# Patient Record
Sex: FEMALE | Race: WHITE | Employment: FULL TIME | URBAN - METROPOLITAN AREA
[De-identification: names, ages, dates, MRNs, and addresses within clinical notes are randomized per-mention and may not be internally consistent; named-entity substitution may affect disease eponyms.]

---

## 2017-03-22 LAB
HPV COMMENT: NORMAL
HPV TYPE 16: NOT DETECTED
HPV TYPE 18: NOT DETECTED
HPVOH (OTHER TYPES): NOT DETECTED

## 2017-12-08 ENCOUNTER — OFFICE VISIT (OUTPATIENT)
Dept: FAMILY MEDICINE CLINIC | Age: 47
End: 2017-12-08

## 2017-12-08 VITALS
TEMPERATURE: 98.2 F | BODY MASS INDEX: 29.56 KG/M2 | HEIGHT: 65 IN | WEIGHT: 177.4 LBS | RESPIRATION RATE: 16 BRPM | DIASTOLIC BLOOD PRESSURE: 80 MMHG | SYSTOLIC BLOOD PRESSURE: 120 MMHG | HEART RATE: 99 BPM | OXYGEN SATURATION: 99 %

## 2017-12-08 DIAGNOSIS — I10 ESSENTIAL HYPERTENSION: ICD-10-CM

## 2017-12-08 DIAGNOSIS — J02.9 SORE THROAT: ICD-10-CM

## 2017-12-08 DIAGNOSIS — Z71.6 ENCOUNTER FOR SMOKING CESSATION COUNSELING: ICD-10-CM

## 2017-12-08 DIAGNOSIS — Z72.0 CURRENT TOBACCO USE: ICD-10-CM

## 2017-12-08 DIAGNOSIS — R06.2 WHEEZING: ICD-10-CM

## 2017-12-08 DIAGNOSIS — R05.9 COUGH: ICD-10-CM

## 2017-12-08 DIAGNOSIS — J40 BRONCHITIS: Primary | ICD-10-CM

## 2017-12-08 LAB — S PYO AG THROAT QL: NORMAL

## 2017-12-08 PROCEDURE — 87880 STREP A ASSAY W/OPTIC: CPT | Performed by: CLINICAL NURSE SPECIALIST

## 2017-12-08 PROCEDURE — 99214 OFFICE O/P EST MOD 30 MIN: CPT | Performed by: CLINICAL NURSE SPECIALIST

## 2017-12-08 RX ORDER — AMOXICILLIN AND CLAVULANATE POTASSIUM 500; 125 MG/1; MG/1
1 TABLET, FILM COATED ORAL 2 TIMES DAILY
Qty: 14 TABLET | Refills: 0 | Status: SHIPPED | OUTPATIENT
Start: 2017-12-08 | End: 2017-12-15

## 2017-12-08 RX ORDER — GUAIFENESIN AND CODEINE PHOSPHATE 100; 10 MG/5ML; MG/5ML
5 SOLUTION ORAL 2 TIMES DAILY PRN
Qty: 140 ML | Refills: 0 | Status: SHIPPED | OUTPATIENT
Start: 2017-12-08 | End: 2017-12-15

## 2017-12-08 RX ORDER — PREDNISONE 20 MG/1
20 TABLET ORAL 2 TIMES DAILY
Qty: 10 TABLET | Refills: 0 | Status: SHIPPED | OUTPATIENT
Start: 2017-12-08 | End: 2017-12-13

## 2017-12-08 RX ORDER — ALBUTEROL SULFATE 90 UG/1
2 AEROSOL, METERED RESPIRATORY (INHALATION) EVERY 6 HOURS PRN
Qty: 1 INHALER | Refills: 0 | Status: CANCELLED | OUTPATIENT
Start: 2017-12-08

## 2017-12-08 RX ORDER — VARENICLINE TARTRATE 25 MG
KIT ORAL
Qty: 1 EACH | Refills: 0 | Status: SHIPPED | OUTPATIENT
Start: 2017-12-08 | End: 2018-01-05 | Stop reason: SDUPTHER

## 2017-12-08 RX ORDER — VARENICLINE TARTRATE 1 MG/1
1 TABLET, FILM COATED ORAL 2 TIMES DAILY
Qty: 60 TABLET | Refills: 0 | Status: SHIPPED | OUTPATIENT
Start: 2017-12-08 | End: 2018-01-05 | Stop reason: SDUPTHER

## 2017-12-08 ASSESSMENT — ENCOUNTER SYMPTOMS
WHEEZING: 1
RHINORRHEA: 1
SINUS PRESSURE: 0
SHORTNESS OF BREATH: 1
NAUSEA: 0
CHEST TIGHTNESS: 1
SINUS PAIN: 0
ABDOMINAL PAIN: 0
SORE THROAT: 1
DIARRHEA: 0
COUGH: 1

## 2017-12-08 NOTE — PATIENT INSTRUCTIONS
Antibiotic as directed, twice a day for 7 days    Flonase 2 puffs to each nostril daily. Albuterol 2 puffs 4 times a day for 2-3 days then as needed. Steroids as directed, take with food. (twice a day with food)    Prescription cough medication twice a day as needed for cough, may cause drowsiness. Cepacol Lozenges as needed for sore throat. Tylenol and or Motrin as needed for pain and fever. Encourage rest and fluids. Chantix as directed, follow up in 6 weeks after starting medication    Patient Education        Bronchitis: Care Instructions  Your Care Instructions    Bronchitis is inflammation of the bronchial tubes, which carry air to the lungs. The tubes swell and produce mucus, or phlegm. The mucus and inflamed bronchial tubes make you cough. You may have trouble breathing. Most cases of bronchitis are caused by viruses like those that cause colds. Antibiotics usually do not help and they may be harmful. Bronchitis usually develops rapidly and lasts about 2 to 3 weeks in otherwise healthy people. Follow-up care is a key part of your treatment and safety. Be sure to make and go to all appointments, and call your doctor if you are having problems. It's also a good idea to know your test results and keep a list of the medicines you take. How can you care for yourself at home? · Take all medicines exactly as prescribed. Call your doctor if you think you are having a problem with your medicine. · Get some extra rest.  · Take an over-the-counter pain medicine, such as acetaminophen (Tylenol), ibuprofen (Advil, Motrin), or naproxen (Aleve) to reduce fever and relieve body aches. Read and follow all instructions on the label. · Do not take two or more pain medicines at the same time unless the doctor told you to. Many pain medicines have acetaminophen, which is Tylenol. Too much acetaminophen (Tylenol) can be harmful.   · Take an over-the-counter cough medicine that contains

## 2017-12-08 NOTE — PROGRESS NOTES
SUBJECTIVE:    Patient ID:  Jenise Tidwell is a 41 y.o. female      Patient is here for an acute visit for URI symptoms and for smoking cessation. She went to and urgent care late October and was diagnosed with bronchitis, treated with Levaquin, steroid dose pack, albuterol inhaler and tessalon. Symptoms resolved for about 2 weeks. She has been a smoker on and off since she was 17year old. Discussed risk, benefits and potential adverse affects of Chantix, patient agreeable to treatment. URI    This is a new problem. Episode onset: 3 weeks ago  The problem has been gradually worsening. There has been no fever. Associated symptoms include congestion (chest), coughing (non productive), a plugged ear sensation, rhinorrhea, a sore throat, vomiting (occ from cough) and wheezing. Pertinent negatives include no abdominal pain, chest pain, diarrhea, headaches, nausea, rash, sinus pain or sneezing. She has tried inhaler use, decongestant and NSAIDs (NyQuil, DayQuil, Mucinex D, Claritin D, Sudafed) for the symptoms. The treatment provided mild relief.      Past Medical History:   Diagnosis Date    Anxiety     Depression     GERD (gastroesophageal reflux disease)     Hyperlipidemia     Hypertension     Migraine     Nausea & vomiting     Obesity     S/P bariatric surgery      Past Surgical History:   Procedure Laterality Date    ACROMIOPLASTY      L Shoulder    CERVICAL FUSION      CERVIX SURGERY      Conization      SECTION      COLPOSCOPY      ENDOMETRIAL ABLATION      LAP BAND  2011    LIPOSUCTION       Family History   Problem Relation Age of Onset    Heart Disease Paternal Grandfather     High Blood Pressure Paternal Grandfather     Stroke Paternal Grandfather     Diabetes Paternal Grandfather     Arthritis Paternal Grandfather     Clotting Disorder Paternal Grandfather     Glaucoma Paternal Grandfather     Heart Disease Paternal Grandmother     High Blood and no frontal sinus tenderness. Mouth/Throat: Posterior oropharyngeal erythema present. No oropharyngeal exudate or posterior oropharyngeal edema. Eyes: Conjunctivae are normal. Pupils are equal, round, and reactive to light. Neck: Normal range of motion. Neck supple. No tracheal deviation present. Cardiovascular: Normal rate, regular rhythm and normal heart sounds. Pulmonary/Chest: Effort normal. No respiratory distress. She has wheezes. She has no rales. She exhibits no tenderness. Abdominal: Soft. Bowel sounds are normal.   Musculoskeletal: Normal range of motion. She exhibits no edema. Lymphadenopathy:     She has no cervical adenopathy. Neurological: She is alert and oriented to person, place, and time. Skin: Skin is warm and dry. No rash noted. Psychiatric: She has a normal mood and affect. Her behavior is normal.   Nursing note and vitals reviewed. /80   Pulse 99   Temp 98.2 °F (36.8 °C)   Resp 16   Ht 5' 5\" (1.651 m)   Wt 177 lb 6.4 oz (80.5 kg)   SpO2 99%   BMI 29.52 kg/m²    BP Readings from Last 3 Encounters:   12/08/17 120/80   09/09/16 118/70   06/09/16 140/84      Wt Readings from Last 3 Encounters:   12/08/17 177 lb 6.4 oz (80.5 kg)   09/09/16 185 lb 9.6 oz (84.2 kg)   07/07/16 184 lb (83.5 kg)       ASSESSMENT & PLAN:    1. Bronchitis  - predniSONE (DELTASONE) 20 MG tablet; Take 1 tablet by mouth 2 times daily for 5 days  Dispense: 10 tablet; Refill: 0  - amoxicillin-clavulanate (AUGMENTIN) 500-125 MG per tablet; Take 1 tablet by mouth 2 times daily for 7 days  Dispense: 14 tablet; Refill: 0  - guaiFENesin-codeine (TUSSI-ORGANIDIN NR) 100-10 MG/5ML syrup; Take 5 mLs by mouth 2 times daily as needed for Cough . Dispense: 140 mL; Refill: 0  - albuterol (PROVENTIL) (2.5 MG/3ML) 0.083% nebulizer solution; Take 3 mLs by nebulization every 6 hours as needed for Wheezing  Dispense: 120 each; Refill: 0  - Albuterol 2 puffs 4 times a day for 2-3 days then as needed.     - Steroids as directed, take with food. (twice a day with food)    2. Wheezing  - predniSONE (DELTASONE) 20 MG tablet; Take 1 tablet by mouth 2 times daily for 5 days  Dispense: 10 tablet; Refill: 0  - albuterol (PROVENTIL) (2.5 MG/3ML) 0.083% nebulizer solution; Take 3 mLs by nebulization every 6 hours as needed for Wheezing  Dispense: 120 each; Refill: 0    3. Cough  - guaiFENesin-codeine (TUSSI-ORGANIDIN NR) 100-10 MG/5ML syrup; Take 5 mLs by mouth 2 times daily as needed for Cough . Dispense: 140 mL; Refill: 0  - Prescription cough medication twice a day as needed for cough, may cause drowsiness. 4. Sore throat  - POCT rapid strep A (negative)  - Cepacol Lozenges as needed for sore throat. - Tylenol and or Motrin as needed for pain and fever. 5. Essential hypertension  - Stable, continue to monitor    6. Current tobacco use  - Encourage tobacco cesstion    7. Encounter for smoking cessation counseling  - Discussed risk, benefits and potential adverse affects of Chantix, patient agreeable to treatment  - varenicline (CHANTIX CONTINUING MONTH PAK) 1 MG tablet; Take 1 tablet by mouth 2 times daily  Dispense: 60 tablet; Refill: 0  - varenicline (CHANTIX STARTING MONTH PAK) 0.5 MG X 11 & 1 MG X 42 tablet; Take by mouth. Dispense: 1 each; Refill: 0      Continue current treatment plan. Return in about 2 weeks (around 12/22/2017), or if symptoms worsen or fail to improve, for bronchitis, wheezing, cough, sore throat, HTN, tobacco use. Call office if experience side effects from medications.

## 2017-12-09 RX ORDER — ALBUTEROL SULFATE 2.5 MG/3ML
2.5 SOLUTION RESPIRATORY (INHALATION) EVERY 6 HOURS PRN
Qty: 120 EACH | Refills: 0 | Status: SHIPPED | OUTPATIENT
Start: 2017-12-09 | End: 2018-01-05 | Stop reason: SDUPTHER

## 2017-12-09 ASSESSMENT — ENCOUNTER SYMPTOMS: VOMITING: 1

## 2017-12-12 ENCOUNTER — TELEPHONE (OUTPATIENT)
Dept: FAMILY MEDICINE CLINIC | Age: 47
End: 2017-12-12

## 2017-12-20 ENCOUNTER — TELEPHONE (OUTPATIENT)
Dept: FAMILY MEDICINE CLINIC | Age: 47
End: 2017-12-20

## 2018-01-05 ENCOUNTER — OFFICE VISIT (OUTPATIENT)
Dept: FAMILY MEDICINE CLINIC | Age: 48
End: 2018-01-05

## 2018-01-05 VITALS
OXYGEN SATURATION: 98 % | HEART RATE: 107 BPM | SYSTOLIC BLOOD PRESSURE: 120 MMHG | RESPIRATION RATE: 16 BRPM | DIASTOLIC BLOOD PRESSURE: 80 MMHG | BODY MASS INDEX: 30.32 KG/M2 | TEMPERATURE: 98.2 F | WEIGHT: 182 LBS | HEIGHT: 65 IN

## 2018-01-05 DIAGNOSIS — J30.89 ACUTE NON-SEASONAL ALLERGIC RHINITIS, UNSPECIFIED TRIGGER: ICD-10-CM

## 2018-01-05 DIAGNOSIS — R05.9 COUGH: ICD-10-CM

## 2018-01-05 DIAGNOSIS — R06.2 WHEEZING: ICD-10-CM

## 2018-01-05 DIAGNOSIS — Z71.6 ENCOUNTER FOR SMOKING CESSATION COUNSELING: ICD-10-CM

## 2018-01-05 DIAGNOSIS — J45.40 MODERATE PERSISTENT ASTHMA WITHOUT COMPLICATION: Primary | ICD-10-CM

## 2018-01-05 PROCEDURE — 99214 OFFICE O/P EST MOD 30 MIN: CPT | Performed by: CLINICAL NURSE SPECIALIST

## 2018-01-05 RX ORDER — VARENICLINE TARTRATE 1 MG/1
1 TABLET, FILM COATED ORAL 2 TIMES DAILY
Qty: 60 TABLET | Refills: 0 | Status: SHIPPED | OUTPATIENT
Start: 2018-01-05 | End: 2019-06-27

## 2018-01-05 RX ORDER — LORATADINE 10 MG/1
10 TABLET ORAL DAILY
Qty: 30 TABLET | Refills: 0 | Status: SHIPPED | OUTPATIENT
Start: 2018-01-05 | End: 2019-06-27

## 2018-01-05 RX ORDER — FLUTICASONE PROPIONATE 110 UG/1
2 AEROSOL, METERED RESPIRATORY (INHALATION) 2 TIMES DAILY
Qty: 1 INHALER | Refills: 0 | Status: SHIPPED | OUTPATIENT
Start: 2018-01-05 | End: 2019-06-27

## 2018-01-05 RX ORDER — ALBUTEROL SULFATE 90 UG/1
2 AEROSOL, METERED RESPIRATORY (INHALATION) EVERY 6 HOURS PRN
Qty: 1 INHALER | Refills: 3 | Status: SHIPPED | OUTPATIENT
Start: 2018-01-05 | End: 2019-06-27

## 2018-01-05 ASSESSMENT — ENCOUNTER SYMPTOMS
SHORTNESS OF BREATH: 1
SORE THROAT: 0
ABDOMINAL PAIN: 0
CHEST TIGHTNESS: 1
DIARRHEA: 0
VOMITING: 0
COUGH: 1
HOARSE VOICE: 1
NAUSEA: 0
HEARTBURN: 0
SPUTUM PRODUCTION: 0
DIFFICULTY BREATHING: 1
HEMOPTYSIS: 0

## 2018-01-05 NOTE — PROGRESS NOTES
present. Cardiovascular: Normal rate, regular rhythm and normal heart sounds. Pulmonary/Chest: Effort normal. No respiratory distress. She has wheezes. She has no rales. She exhibits no tenderness. Abdominal: Soft. Bowel sounds are normal. She exhibits no distension and no mass. There is no tenderness. There is no rebound and no guarding. Musculoskeletal: Normal range of motion. She exhibits no edema. Neurological: She is alert and oriented to person, place, and time. No cranial nerve deficit. Skin: Skin is warm and dry. No rash noted. Psychiatric: She has a normal mood and affect. Her behavior is normal.   Nursing note and vitals reviewed. /80   Pulse 107   Temp 98.2 °F (36.8 °C)   Resp 16   Ht 5' 5\" (1.651 m)   Wt 182 lb (82.6 kg)   LMP 08/01/2017   SpO2 98%   BMI 30.29 kg/m²    BP Readings from Last 3 Encounters:   01/05/18 120/80   12/08/17 120/80   09/09/16 118/70      Wt Readings from Last 3 Encounters:   01/05/18 182 lb (82.6 kg)   12/08/17 177 lb 6.4 oz (80.5 kg)   09/09/16 185 lb 9.6 oz (84.2 kg)       ASSESSMENT & PLAN:    1. Moderate persistent asthma without complication  - asthma versus COPD  - Consider PFT's if no improvement with Flovent  - albuterol sulfate HFA (PROAIR HFA) 108 (90 Base) MCG/ACT inhaler; Inhale 2 puffs into the lungs every 6 hours as needed for Wheezing  Dispense: 1 Inhaler; Refill: 3  - fluticasone (FLOVENT HFA) 110 MCG/ACT inhaler; Inhale 2 puffs into the lungs 2 times daily  Dispense: 1 Inhaler; Refill: 0 (rinse mouth out after use)  - Discussed the difference between maintenance and rescue inhalers  - Trial of Flovent 1 puff twice a day, can go up to 2 puffs twice a day    2. Wheezing  - albuterol sulfate HFA (PROAIR HFA) 108 (90 Base) MCG/ACT inhaler; Inhale 2 puffs into the lungs every 6 hours as needed for Wheezing  Dispense: 1 Inhaler; Refill: 3  - fluticasone (FLOVENT HFA) 110 MCG/ACT inhaler;  Inhale 2 puffs into the lungs 2 times daily Dispense: 1 Inhaler; Refill: 0 (rinse mouth out after use)    3. Cough  - XR chest standard (2 VW)    4. Encounter for smoking cessation counseling  - varenicline (CHANTIX CONTINUING MONTH RADHA) 1 MG tablet; Take 1 tablet by mouth 2 times daily  Dispense: 60 tablet; Refill: 0  - Encourage smoking cessation    5. Acute non-seasonal allergic rhinitis, unspecified trigger  - loratadine (CLARITIN) 10 MG tablet; Take 1 tablet by mouth daily  Dispense: 30 tablet; Refill: 0    6. Essential hypertension  - Stable on current regimen    Continue current treatment plan. Return in about 4 weeks (around 2/2/2018), or if symptoms worsen or fail to improve, for asthma, wheezing, cough, smoking cesstion, AR, HTN. Call office if experience side effects from medications.

## 2018-01-05 NOTE — PATIENT INSTRUCTIONS
Now\" link. Current as of: May 12, 2017  Content Version: 11.5  © 2978-1280 [employer]. Care instructions adapted under license by [facility] ([facility]). If you have questions about a medical condition or this instruction, always ask your healthcare professional. [address] any warranty or liability for your use of this information. Patient Education        Learning About COPD, Asthma, and Air Pollution  How does air pollution affect COPD and asthma? When you have COPD or asthma, air pollution may make your symptoms worse. If it does, it means that air pollution is a trigger for you. It is important to know what your triggers are and how to deal with them. If air pollution is a trigger for you, you need to learn about air quality and pay attention to weather forecasts that include how bad the air is expected to be. How can you manage a flare-up caused by air pollution? · Do not panic. Quick treatment at home may help you prevent serious breathing problems. · Take your medicines exactly as your doctor tells you. ¨ Use your quick-relief inhaler as directed by your doctor. If your symptoms do not get better after you use your medicine, have someone take you to the emergency room. Call an ambulance if necessary. ¨ With inhaled medicines, a spacer or a nebulizer may help you get more medicine to your lungs. Ask your doctor or pharmacist how to use them properly. Practice using the spacer in front of a mirror before you have a flare-up. This may help you get the medicine into your lungs quickly. ¨ If your doctor has given you steroid pills, take them as directed. ¨ Talk to your doctor if you have any problems with your medicine. What can you do to prevent flare-ups? · Try not to be outside when air pollution levels are high. Stay at home with your windows closed. · Do not smoke.  This is the most important step you can take to prevent more damage to your lungs and prevent working. · Do not smoke or allow others to smoke around you. Avoid smoky places. Smoking makes asthma worse. If you need help quitting, talk to your doctor about stop-smoking programs and medicines. These can increase your chances of quitting for good. · Learn what triggers an asthma attack for you, and avoid the triggers when you can. Common triggers include colds, smoke, air pollution, dust, pollen, mold, pets, cockroaches, stress, and cold air. · Avoid colds and the flu. Get a pneumococcal vaccine shot. If you have had one before, ask your doctor whether you need a second dose. Get a flu vaccine every fall. If you must be around people with colds or the flu, wash your hands often. When should you call for help? Call 911 anytime you think you may need emergency care. For example, call if:  ? · You have severe trouble breathing. ?Call your doctor now or seek immediate medical care if:  ? · Your symptoms do not get better after you have followed your asthma action plan. ? · You cough up yellow, dark brown, or bloody mucus (sputum). ? Watch closely for changes in your health, and be sure to contact your doctor if:  ? · Your coughing and wheezing get worse. ? · You need to use quick-relief medicine on more than 2 days a week (unless it is just for exercise). ? · You need help figuring out what is triggering your asthma attacks. Where can you learn more? Go to https://[email or web address] org and sign in to your Open[employer] account. Enter P597 in the [facility] box to learn more about \"Asthma in Adults: Care Instructions. \"     If you do not have an account, please click on the \"Sign Up Now\" link. Current as of: May 12, 2017  Content Version: 11.5  © 6931-1856 [employer], [employer]. Care instructions adapted under license by [facility] ([facility]).  If you have questions about a medical condition or this instruction, always ask your healthcare professional. [address] any warranty or liability for your use of this information.

## 2018-01-14 ENCOUNTER — TELEPHONE (OUTPATIENT)
Dept: FAMILY MEDICINE CLINIC | Age: 48
End: 2018-01-14

## 2018-01-14 ASSESSMENT — ENCOUNTER SYMPTOMS
SINUS PRESSURE: 0
RHINORRHEA: 1
TROUBLE SWALLOWING: 0
WHEEZING: 1

## 2019-06-27 ENCOUNTER — HOSPITAL ENCOUNTER (EMERGENCY)
Age: 49
Discharge: HOME | End: 2019-06-28
Attending: INTERNAL MEDICINE | Admitting: INTERNAL MEDICINE
Payer: COMMERCIAL

## 2019-06-27 DIAGNOSIS — M79.605 LEFT LEG PAIN: ICD-10-CM

## 2019-06-27 PROCEDURE — 99284 EMERGENCY DEPT VISIT MOD MDM: CPT

## 2019-06-27 ASSESSMENT — PAIN SCALES - GENERAL: PAINLEVEL_OUTOF10: 8

## 2019-06-27 NOTE — LETTER
[facility]  [address] [facility], [address]             June 28, 2019    Patient: Danilo Carrasquillo   YOB: 1970   Date of Visit: 6/27/2019       To Whom It May Concern:    Erica Brooks was seen and treated in our emergency department on 6/27/2019. She may return to work on Monday 7/1/2019.       Sincerely,         [facility]

## 2019-06-28 ENCOUNTER — APPOINTMENT (OUTPATIENT)
Dept: GENERAL RADIOLOGY | Age: 49
End: 2019-06-28
Payer: COMMERCIAL

## 2019-06-28 VITALS
HEART RATE: 82 BPM | TEMPERATURE: 98.1 F | SYSTOLIC BLOOD PRESSURE: 135 MMHG | OXYGEN SATURATION: 96 % | DIASTOLIC BLOOD PRESSURE: 76 MMHG | RESPIRATION RATE: 16 BRPM

## 2019-06-28 PROCEDURE — 6370000000 HC RX 637 (ALT 250 FOR IP): Performed by: PHYSICIAN ASSISTANT

## 2019-06-28 PROCEDURE — 73502 X-RAY EXAM HIP UNI 2-3 VIEWS: CPT

## 2019-06-28 RX ORDER — PREDNISONE 10 MG/1
60 TABLET ORAL DAILY
Qty: 30 TABLET | Refills: 0 | Status: SHIPPED | OUTPATIENT
Start: 2019-06-28 | End: 2019-07-03

## 2019-06-28 RX ORDER — HYDROCODONE BITARTRATE AND ACETAMINOPHEN 5; 325 MG/1; MG/1
2 TABLET ORAL ONCE
Status: COMPLETED | OUTPATIENT
Start: 2019-06-28 | End: 2019-06-28

## 2019-06-28 RX ORDER — PREDNISONE 20 MG/1
60 TABLET ORAL ONCE
Status: COMPLETED | OUTPATIENT
Start: 2019-06-28 | End: 2019-06-28

## 2019-06-28 RX ADMIN — HYDROCODONE BITARTRATE AND ACETAMINOPHEN 2 TABLET: 5; 325 TABLET ORAL at 00:53

## 2019-06-28 RX ADMIN — PREDNISONE 60 MG: 20 TABLET ORAL at 00:53

## 2019-06-28 ASSESSMENT — PAIN SCALES - GENERAL
PAINLEVEL_OUTOF10: 8
PAINLEVEL_OUTOF10: 10

## 2019-06-28 ASSESSMENT — PAIN DESCRIPTION - PAIN TYPE: TYPE: ACUTE PAIN

## 2019-06-28 ASSESSMENT — PAIN DESCRIPTION - ORIENTATION: ORIENTATION: LEFT

## 2019-06-28 ASSESSMENT — PAIN DESCRIPTION - LOCATION: LOCATION: LEG

## 2019-06-28 ASSESSMENT — ENCOUNTER SYMPTOMS
BACK PAIN: 0
VOMITING: 0
NAUSEA: 0

## 2019-06-28 ASSESSMENT — PAIN DESCRIPTION - PROGRESSION: CLINICAL_PROGRESSION: GRADUALLY IMPROVING

## 2019-06-28 NOTE — ED PROVIDER NOTES
[facility]        Pt Name: Lidya Farr  MRN: [id number]  [place] 1970  Date of evaluation: 6/27/2019  Provider: Ara Naranjo PA-C  PCP: Vijaya Loredo MD    This patient was not seen and evaluated by the attending physician but were available for consultation as needed      [facility]       Chief Complaint   Patient presents with    Leg Pain     lots of travel with work and c/o pain in left hip and down leg. feels tight. HISTORY OF PRESENT ILLNESS   (Location/Symptom, Timing/Onset, Context/Setting, Quality, Duration, Modifying Factors, Severity)  Note limiting factors. Lidya Farr is a 54 y.o. female who presents to the emergency department today for evaluation for left leg pain. The patient states that she has had constant pain to her posterior left thigh, and she states that this started this afternoon. The patient states that her pain is only to the posterior aspect of her thigh, she denies any back pain. She denies any fall or injury. She states that her pain is rated as a 8/10, she has tried to take ibuprofen as well as muscle relaxers at home without any improvement. The patient states that she has been able to ambulate but she has been experiencing pain. The patient states that she has had some travels to [facility] recently for work. She denies any estrogen therapies. She denies any chest pain or shortness of breath. No history of DVT or PE. She denies any pain to the calf or swelling to the calf. She is very specific that her pain is to her posterior thigh on the left. Patient denies any fever or chills. No numbness, tingling or weakness. No bowel or bladder incontinence or retention. No saddle anesthesia. no nausea or vomiting. No other complaints. Nursing Notes were all reviewed and agreed with or any disagreements were addressed  in the HPI.     REVIEW OF SYSTEMS    (2-9 systems for level 4, 10 or more for level 5)     Review of Systems   Constitutional: Negative for activity change, appetite change and fever. Gastrointestinal: Negative for nausea and vomiting. Musculoskeletal: Positive for myalgias. Negative for arthralgias and back pain. Skin: Negative for wound. Neurological: Negative for weakness and numbness. Positives and Pertinent negatives as per HPI. Except as noted abovein the ROS, all other systems were reviewed and negative. PAST MEDICAL HISTORY     Past Medical History:   Diagnosis Date    Anxiety     Depression     GERD (gastroesophageal reflux disease)     Hyperlipidemia     Hypertension     Migraine     Nausea & vomiting     Obesity     S/P bariatric surgery          SURGICAL HISTORY     Past Surgical History:   Procedure Laterality Date    ACROMIOPLASTY      L Shoulder    CERVICAL FUSION      CERVIX SURGERY      Conization      SECTION      COLPOSCOPY      ENDOMETRIAL ABLATION      LAP BAND      LIPOSUCTION           CURRENTMEDICATIONS       Discharge Medication List as of 2019  1:46 AM      CONTINUE these medications which have NOT CHANGED    Details   lamoTRIgine (LAMICTAL) 200 MG tablet Take 200 mg by mouth daily               ALLERGIES     Ace inhibitors;  Percocet [oxycodone-acetaminophen]; Sulfa antibiotics; and Codeine    FAMILYHISTORY       Family History   Problem Relation Age of Onset    Heart Disease Paternal Grandfather     High Blood Pressure Paternal Grandfather     Stroke Paternal Grandfather     Diabetes Paternal Grandfather     Arthritis Paternal Grandfather     Clotting Disorder Paternal Grandfather     Glaucoma Paternal Grandfather     Heart Disease Paternal Grandmother     High Blood Pressure Paternal Grandmother     Stroke Paternal Grandmother     Diabetes Paternal Grandmother     Arthritis Paternal Grandmother     Clotting Disorder Paternal Grandmother     Glaucoma Paternal Grandmother           SOCIAL HISTORY       Social History     Socioeconomic History    Marital status:      Spouse name: None    Number of children: None    Years of education: None    Highest education level: None   Occupational History    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[employer] [address] resource strain: None    Food insecurity:     Worry: None     Inability: None    Transportation needs:     Medical: None     Non-medical: None   Tobacco Use    Smoking status: Former Smoker     Packs/day: 1.00     Years: 20.00     Pack years: 20.00     Types: Cigarettes     Last attempt to quit: 2013     Years since quittin.5    Smokeless tobacco: Never Used   Substance and Sexual Activity    Alcohol use: Yes     Comment: RARELY    Drug use: No    Sexual activity: Yes     Partners: Male   Lifestyle    Physical activity:     Days per week: None     Minutes per session: None    Stress: None   Relationships    Social connections:     Talks on phone: None     Gets together: None     Attends [employer]ism service: None     Active member of club or organization: None     Attends meetings of clubs or organizations: None     Relationship status: None    Intimate partner violence:     Fear of current or ex partner: None     Emotionally abused: None     Physically abused: None     Forced sexual activity: None   Other Topics Concern    None   Social History Narrative    None       SCREENINGS             PHYSICAL EXAM    (up to 7 for level 4, 8 or more for level 5)     ED Triage Vitals [19 2236]   BP Temp Temp Source Pulse Resp SpO2 Height Weight   (!) 148/83 98.1 °F (36.7 °C) Infrared 108 16 97 % -- --       Physical Exam   Constitutional: She is oriented to person, place, and time. She appears well-developed and well-nourished. HENT:   Head: Normocephalic and atraumatic.    Right Ear: External ear normal.   Left Ear: External ear normal.   Nose: Nose normal.   Eyes: Right eye exhibits no discharge. Left eye exhibits no discharge. Neck: Normal range of motion. Neck supple. No tracheal deviation present. Cardiovascular: Intact distal pulses. Pulmonary/Chest: Effort normal. No respiratory distress. Musculoskeletal: Normal range of motion. There is tenderness to palpation to the left mid posterior thigh. There is no overlying erythema, edema, ecchymosis or warmth. There is no tenderness over the back, hip or knee. Dorsalis pedis and posterior tibialis pulses are 2+ bilaterally. Normal sensation light touch. Negative Homans sign bilaterally. No tenderness over the calf. Motor strength is 5/5 throughout. Patellar reflexes are 2+ bilaterally   Neurological: She is alert and oriented to person, place, and time. Skin: Skin is warm and dry. She is not diaphoretic. Psychiatric: She has a normal mood and affect. Her behavior is normal.   Nursing note and vitals reviewed. DIAGNOSTIC RESULTS   LABS:    Labs Reviewed - No data to display    All other labs were within normal range or not returned as of this dictation. EKG: All EKG's are interpreted by the Emergency Department Physician in the absence of a cardiologist.  Please see their note for interpretation of EKG. RADIOLOGY:   Non-plain film images such as CT, Ultrasound and MRI are read by the radiologist. Plain radiographic images are visualized andpreliminarily interpreted by the  ED Provider with the below findings:        Interpretation perthe Radiologist below, if available at the time of this note:    XR HIP LEFT (2-3 VIEWS)   Final Result   No acute fracture or malalignment. VL Extremity Venous Left    (Results Pending)     Xr Hip Left (2-3 Views)    Result Date: 6/28/2019  EXAMINATION: TWO XRAY VIEWS OF THE LEFT HIP 6/28/2019 12:53 am COMPARISON: None.  HISTORY: ORDERING SYSTEM PROVIDED HISTORY: Injury TECHNOLOGIST PROVIDED HISTORY: Reason for exam:->Injury Ordering Physician Provided Reason for Exam: L/hip pain Acuity: Acute Type of Exam: Initial FINDINGS: Hips are intact and in anatomic alignment. Bony pelvis maintained. Unremarkable soft tissues. No acute fracture or malalignment. PROCEDURES   Unless otherwise noted below, none     Procedures    CRITICAL CARE TIME   N/A    CONSULTS:  None      EMERGENCY DEPARTMENT COURSE and DIFFERENTIAL DIAGNOSIS/MDM:   Vitals:    Vitals:    06/27/19 2236 06/28/19 0145   BP: (!) 148/83 135/76   Pulse: 108 82   Resp: 16 16   Temp: 98.1 °F (36.7 °C)    TempSrc: Infrared    SpO2: 97% 96%       Patient was given thefollowing medications:  Medications   HYDROcodone-acetaminophen (NORCO) 5-325 MG per tablet 2 tablet (2 tablets Oral Given 6/28/19 0053)   predniSONE (DELTASONE) tablet 60 mg (60 mg Oral Given 6/28/19 0053)       The patient presents to the emergency department today for evaluation for left leg pain. The patient states that she has had constant pain to her posterior left thigh, and she states that this started this afternoon. The patient states that her pain is only to the posterior aspect of her thigh, she denies any back pain. She denies any fall or injury. She states that her pain is rated as a 8/10, she has tried to take ibuprofen as well as muscle relaxers at home without any improvement. The patient states that she has been able to ambulate but she has been experiencing pain. The patient states that she has had some travels to [facility] recently for work. She denies any estrogen therapies. She denies any chest pain or shortness of breath. No history of DVT or PE. She denies any pain to the calf or swelling to the calf. She is very specific that her pain is to her posterior thigh on the left. Patient denies any fever or chills. No nausea or vomiting. No other complaints.     On physical exam she does have tenderness to the posterior aspect of her left thigh, there is no other bony tenderness she is neurologically neurovascularly intact. X-ray is unremarkable. Patient was given Norco as well as prednisone in the ED. Upon reevaluation she is overall feeling better. Unfortunately ultrasound is unavailable at this time, I did offer the patient lab work to rule out a DVT at this time, and start empirically on possible anticoagulants, although my suspicion is low for a DVT she does have recent travel. Patient declines this, she states that she would just get the ultrasound done tomorrow. Patient will be given a prescription for prednisone as I believe that she could have some portion of sciatica causing her symptoms. She is to otherwise follow-up with her primary care physician in 2 to 3 days for reevaluation. She is to return to the ED for new or worsening symptoms. Patient voiced understanding is agreeable to plan. Stable for discharge. My suspicions at this time for occult fracture, dislocation, subluxation, arterial occlusion, cellulitis, abscess, necrotizing fasciitis, cauda equina syndrome or other emergent etiology. FINAL IMPRESSION      1.  Left leg pain          DISPOSITION/PLAN   DISPOSITION Decision To Discharge 06/28/2019 01:35:42 AM      PATIENT REFERREDTO:  Paz Lyons MD  [address], [address] [facility]. [address]  [phone]    Schedule an appointment as soon as possible for a visit in 2 days      [facility] Emergency Department  [address] [place] [address]  [phone]    As needed, If symptoms worsen      DISCHARGE MEDICATIONS:  Discharge Medication List as of 6/28/2019  1:46 AM      START taking these medications    Details   predniSONE (DELTASONE) 10 MG tablet Take 6 tablets by mouth daily for 5 doses, Disp-30 tablet, R-0Print             DISCONTINUED MEDICATIONS:  Discharge Medication List as of 6/28/2019  1:46 AM      STOP taking these medications       albuterol sulfate HFA (PROAIR HFA) 108 (90 Base) MCG/ACT inhaler Comments:   Reason for Stopping:         varenicline (CHANTIX CONTINUING MONTH RADHA) 1 MG tablet Comments:   Reason for Stopping:         fluticasone (FLOVENT HFA) 110 MCG/ACT inhaler Comments:   Reason for Stopping:         loratadine (CLARITIN) 10 MG tablet Comments:   Reason for Stopping:         topiramate (TOPAMAX) 50 MG tablet Comments:   Reason for Stopping:         methylPREDNISolone (MEDROL DOSEPACK) 4 MG tablet Comments:   Reason for Stopping:         ergocalciferol (DRISDOL) 64980 UNITS capsule Comments:   Reason for Stopping:         meloxicam (MOBIC) 7.5 MG tablet Comments:   Reason for Stopping:         VASCEPA 1 G CAPS capsule Comments:   Reason for Stopping:         ARIPiprazole (ABILIFY) 5 MG tablet Comments:   Reason for Stopping:                      (Please note that portions ofthis note were completed with a voice recognition program.  Efforts were made to edit the dictations but occasionally words are mis-transcribed.)    Zara Forbes PA-C (electronically signed)            Zara Forbes PA-C  06/28/19 1929

## 2019-06-28 NOTE — ED NOTES
Pt c/o left buttocks pain radiating down left back of leg like sciatica pain. Pt denies any hx of sciatica. Denies any known injury. Respirations even and unlabored. Meds given per orders. Pt to xray.      Paris Valentine, DELIO  06/28/19 2118

## 2019-08-27 LAB
CANDIDA SPECIES, DNA PROBE: NORMAL
GARDNERELLA VAGINALIS, DNA PROBE: NORMAL
TRICHOMONAS VAGINALIS DNA: NORMAL

## 2020-07-13 ENCOUNTER — HOSPITAL ENCOUNTER (EMERGENCY)
Age: 50
Discharge: HOME | End: 2020-07-13
Attending: EMERGENCY MEDICINE
Payer: COMMERCIAL

## 2020-07-13 ENCOUNTER — APPOINTMENT (OUTPATIENT)
Dept: GENERAL RADIOLOGY | Age: 50
End: 2020-07-13
Payer: COMMERCIAL

## 2020-07-13 ENCOUNTER — APPOINTMENT (OUTPATIENT)
Dept: CT IMAGING | Age: 50
End: 2020-07-13
Payer: COMMERCIAL

## 2020-07-13 VITALS
OXYGEN SATURATION: 96 % | HEIGHT: 65 IN | SYSTOLIC BLOOD PRESSURE: 103 MMHG | HEART RATE: 47 BPM | TEMPERATURE: 97.6 F | RESPIRATION RATE: 10 BRPM | DIASTOLIC BLOOD PRESSURE: 58 MMHG | BODY MASS INDEX: 30.16 KG/M2 | WEIGHT: 181 LBS

## 2020-07-13 LAB
A/G RATIO: 1.4 (ref 1.1–2.2)
ALBUMIN SERPL-MCNC: 4 G/DL (ref 3.4–5)
ALP BLD-CCNC: 75 U/L (ref 40–129)
ALT SERPL-CCNC: 13 U/L (ref 10–40)
ANION GAP SERPL CALCULATED.3IONS-SCNC: 13 MMOL/L (ref 3–16)
AST SERPL-CCNC: 13 U/L (ref 15–37)
BASOPHILS ABSOLUTE: 0 K/UL (ref 0–0.2)
BASOPHILS RELATIVE PERCENT: 0.5 %
BILIRUB SERPL-MCNC: 0.4 MG/DL (ref 0–1)
BUN BLDV-MCNC: 19 MG/DL (ref 7–20)
CALCIUM SERPL-MCNC: 9.1 MG/DL (ref 8.3–10.6)
CHLORIDE BLD-SCNC: 100 MMOL/L (ref 99–110)
CO2: 24 MMOL/L (ref 21–32)
CREAT SERPL-MCNC: 0.8 MG/DL (ref 0.6–1.1)
EOSINOPHILS ABSOLUTE: 0.2 K/UL (ref 0–0.6)
EOSINOPHILS RELATIVE PERCENT: 2 %
GFR AFRICAN AMERICAN: >60
GFR NON-AFRICAN AMERICAN: >60
GLOBULIN: 2.9 G/DL
GLUCOSE BLD-MCNC: 109 MG/DL (ref 70–99)
HCG QUALITATIVE: NEGATIVE
HCT VFR BLD CALC: 42.1 % (ref 36–48)
HEMOGLOBIN: 13.8 G/DL (ref 12–16)
LYMPHOCYTES ABSOLUTE: 2.8 K/UL (ref 1–5.1)
LYMPHOCYTES RELATIVE PERCENT: 26.5 %
MCH RBC QN AUTO: 28.6 PG (ref 26–34)
MCHC RBC AUTO-ENTMCNC: 32.9 G/DL (ref 31–36)
MCV RBC AUTO: 87 FL (ref 80–100)
MONOCYTES ABSOLUTE: 0.7 K/UL (ref 0–1.3)
MONOCYTES RELATIVE PERCENT: 6.4 %
NEUTROPHILS ABSOLUTE: 6.7 K/UL (ref 1.7–7.7)
NEUTROPHILS RELATIVE PERCENT: 64.6 %
PDW BLD-RTO: 14.6 % (ref 12.4–15.4)
PLATELET # BLD: 311 K/UL (ref 135–450)
PMV BLD AUTO: 7.3 FL (ref 5–10.5)
POTASSIUM SERPL-SCNC: 4.1 MMOL/L (ref 3.5–5.1)
PRO-BNP: 49 PG/ML (ref 0–124)
RBC # BLD: 4.84 M/UL (ref 4–5.2)
SODIUM BLD-SCNC: 137 MMOL/L (ref 136–145)
TOTAL PROTEIN: 6.9 G/DL (ref 6.4–8.2)
TROPONIN: <0.01 NG/ML
TROPONIN: <0.01 NG/ML
WBC # BLD: 10.4 K/UL (ref 4–11)

## 2020-07-13 PROCEDURE — 71260 CT THORAX DX C+: CPT

## 2020-07-13 PROCEDURE — 84703 CHORIONIC GONADOTROPIN ASSAY: CPT

## 2020-07-13 PROCEDURE — 93005 ELECTROCARDIOGRAM TRACING: CPT | Performed by: EMERGENCY MEDICINE

## 2020-07-13 PROCEDURE — 84484 ASSAY OF TROPONIN QUANT: CPT

## 2020-07-13 PROCEDURE — 71045 X-RAY EXAM CHEST 1 VIEW: CPT

## 2020-07-13 PROCEDURE — 6360000002 HC RX W HCPCS: Performed by: NURSE PRACTITIONER

## 2020-07-13 PROCEDURE — 36415 COLL VENOUS BLD VENIPUNCTURE: CPT

## 2020-07-13 PROCEDURE — 83880 ASSAY OF NATRIURETIC PEPTIDE: CPT

## 2020-07-13 PROCEDURE — 80053 COMPREHEN METABOLIC PANEL: CPT

## 2020-07-13 PROCEDURE — 96374 THER/PROPH/DIAG INJ IV PUSH: CPT

## 2020-07-13 PROCEDURE — 96375 TX/PRO/DX INJ NEW DRUG ADDON: CPT

## 2020-07-13 PROCEDURE — 93005 ELECTROCARDIOGRAM TRACING: CPT | Performed by: NURSE PRACTITIONER

## 2020-07-13 PROCEDURE — 85025 COMPLETE CBC W/AUTO DIFF WBC: CPT

## 2020-07-13 PROCEDURE — 6360000002 HC RX W HCPCS: Performed by: EMERGENCY MEDICINE

## 2020-07-13 PROCEDURE — 99285 EMERGENCY DEPT VISIT HI MDM: CPT

## 2020-07-13 PROCEDURE — 6360000004 HC RX CONTRAST MEDICATION: Performed by: NURSE PRACTITIONER

## 2020-07-13 PROCEDURE — 6370000000 HC RX 637 (ALT 250 FOR IP): Performed by: NURSE PRACTITIONER

## 2020-07-13 RX ORDER — POTASSIUM CHLORIDE 750 MG/1
40 TABLET, FILM COATED, EXTENDED RELEASE ORAL ONCE
Status: DISCONTINUED | OUTPATIENT
Start: 2020-07-13 | End: 2020-07-13

## 2020-07-13 RX ORDER — MORPHINE SULFATE 4 MG/ML
4 INJECTION, SOLUTION INTRAMUSCULAR; INTRAVENOUS ONCE
Status: COMPLETED | OUTPATIENT
Start: 2020-07-13 | End: 2020-07-13

## 2020-07-13 RX ORDER — ALBUTEROL SULFATE 2.5 MG/3ML
2.5 SOLUTION RESPIRATORY (INHALATION) EVERY 4 HOURS PRN
COMMUNITY
Start: 2020-01-04

## 2020-07-13 RX ORDER — ASPIRIN 81 MG/1
324 TABLET, CHEWABLE ORAL ONCE
Status: COMPLETED | OUTPATIENT
Start: 2020-07-13 | End: 2020-07-13

## 2020-07-13 RX ORDER — MONTELUKAST SODIUM 10 MG/1
10 TABLET ORAL NIGHTLY
COMMUNITY

## 2020-07-13 RX ORDER — SUCRALFATE 1 G/1
1 TABLET ORAL 4 TIMES DAILY
Qty: 120 TABLET | Refills: 3 | Status: SHIPPED | OUTPATIENT
Start: 2020-07-13

## 2020-07-13 RX ORDER — HYDROMORPHONE HYDROCHLORIDE 1 MG/ML
0.5 INJECTION, SOLUTION INTRAMUSCULAR; INTRAVENOUS; SUBCUTANEOUS ONCE
Status: COMPLETED | OUTPATIENT
Start: 2020-07-13 | End: 2020-07-13

## 2020-07-13 RX ADMIN — MORPHINE SULFATE 4 MG: 4 INJECTION INTRAVENOUS at 10:18

## 2020-07-13 RX ADMIN — HYDROMORPHONE HYDROCHLORIDE 0.5 MG: 1 INJECTION, SOLUTION INTRAMUSCULAR; INTRAVENOUS; SUBCUTANEOUS at 13:53

## 2020-07-13 RX ADMIN — ASPIRIN 324 MG: 81 TABLET, CHEWABLE ORAL at 10:18

## 2020-07-13 RX ADMIN — IOPAMIDOL 75 ML: 755 INJECTION, SOLUTION INTRAVENOUS at 11:07

## 2020-07-13 ASSESSMENT — PAIN SCALES - GENERAL
PAINLEVEL_OUTOF10: 7
PAINLEVEL_OUTOF10: 7
PAINLEVEL_OUTOF10: 4
PAINLEVEL_OUTOF10: 8

## 2020-07-13 ASSESSMENT — PAIN DESCRIPTION - LOCATION: LOCATION: CHEST

## 2020-07-13 ASSESSMENT — ENCOUNTER SYMPTOMS
VOMITING: 0
ABDOMINAL PAIN: 0
CHEST TIGHTNESS: 0
NAUSEA: 0
SHORTNESS OF BREATH: 0
DIARRHEA: 0

## 2020-07-13 ASSESSMENT — HEART SCORE: ECG: 0

## 2020-07-13 ASSESSMENT — PAIN DESCRIPTION - PROGRESSION: CLINICAL_PROGRESSION: GRADUALLY IMPROVING

## 2020-07-13 ASSESSMENT — PAIN DESCRIPTION - PAIN TYPE: TYPE: ACUTE PAIN

## 2020-07-13 NOTE — ED PROVIDER NOTES
[address] [facility]        Pt Name: Ember Ware  MRN: [id number]  [place] 1970  Date of evaluation: 7/13/2020  Provider: SHAKA Quick CNP  PCP: Lewis Florence MD     I have seen and evaluated this patient with my supervising physician Elayne Aceves MD.    [address] [place] [address]       Chief Complaint   Patient presents with    Chest Pain     chest pain started today       HISTORY OF PRESENT ILLNESS   (Location, Timing/Onset, Context/Setting, Quality, Duration, Modifying Factors, Severity, Associated Signs and Symptoms)  Note limiting factors. Ember Ware is a 52 y.o. female presents to the ER with a complaint of midsternal chest pain since 0830 this morning after leaving the doctors office. States that pain has been constant and radiates to her back, describes as gripping. No mitigating or exacerbating factors. She did have the fluid removed from her lap band (placed 2011) last week, by DR. [facility]. States that she was vomiting and having trouble with digestion, that has since resolved. Hannah Ochoa, primary care. Last appointment in April. Started vitamin D therapy at that point. Blood pressure and cholesterol are controlled without medication. Everyday cigarette smoker, about a pack per day. No history of chest pain or cardiac disease. No family history of heart disease. No history of stress test.    Denies any headache, fever, lightheadedness, dizziness, visual disturbances. No neck or back pain. No shortness of breath, cough, or congestion. No abdominal pain, nausea, vomiting, diarrhea, constipation, or dysuria. No rash. Nursing Notes were all reviewed and agreed with or any disagreements were addressed in the HPI. REVIEW OF SYSTEMS    (2-9 systems for level 4, 10 or more for level 5)     Review of Systems   Constitutional: Negative for activity change, chills and fever. Respiratory: Negative for chest tightness and shortness of breath. Cardiovascular: Positive for chest pain. Gastrointestinal: Negative for abdominal pain, diarrhea, nausea and vomiting. Genitourinary: Negative for dysuria. All other systems reviewed and are negative. Positives and Pertinent negatives as per HPI. Except as noted above in the ROS, all other systems were reviewed and negative. PAST MEDICAL HISTORY     Past Medical History:   Diagnosis Date    Anxiety     Depression     GERD (gastroesophageal reflux disease)     Hyperlipidemia     Hypertension     Migraine     Nausea & vomiting     Obesity     S/P bariatric surgery          SURGICAL HISTORY     Past Surgical History:   Procedure Laterality Date    ACROMIOPLASTY      L Shoulder    CERVICAL FUSION      CERVIX SURGERY      Conization      SECTION      COLPOSCOPY      ENDOMETRIAL ABLATION      LAP BAND      LIPOSUCTION           CURRENTMEDICATIONS       Discharge Medication List as of 2020  3:26 PM      CONTINUE these medications which have NOT CHANGED    Details   montelukast (SINGULAIR) 10 MG tablet Take 10 mg by mouth nightlyHistorical Med      albuterol (PROVENTIL) (2.5 MG/3ML) 0.083% nebulizer solution Inhale 2.5 mg into the lungs every 4 hours as neededHistorical Med      lamoTRIgine (LAMICTAL) 200 MG tablet Take 200 mg by mouth daily               ALLERGIES     Percocet [oxycodone-acetaminophen]; Sulfa antibiotics; Sulfasalazine; Ace inhibitors;  Codeine; and Oxycodone-acetaminophen    FAMILYHISTORY       Family History   Problem Relation Age of Onset    Heart Disease Paternal Grandfather     High Blood Pressure Paternal Grandfather     Stroke Paternal Grandfather     Diabetes Paternal Grandfather     Arthritis Paternal Grandfather     Clotting Disorder Paternal Grandfather     Glaucoma Paternal Grandfather     Heart Disease Paternal Grandmother     High Blood Pressure Paternal Grandmother     Stroke Paternal Grandmother     Diabetes Paternal Grandmother     Arthritis Paternal Grandmother     Clotting Disorder Paternal Grandmother     Glaucoma Paternal Grandmother           SOCIAL HISTORY       Social History     Tobacco Use    Smoking status: Former Smoker     Packs/day: 1.00     Years: 20.00     Pack years: 20.00     Types: Cigarettes     Last attempt to quit: 2013     Years since quittin.5    Smokeless tobacco: Never Used   Substance Use Topics    Alcohol use: Yes     Comment: RARELY    Drug use: No       SCREENINGS      Heart Score for chest pain patients  History: Slightly Suspicious  ECG: Normal  Patient Age: > 39 and < 65 years  *Risk factors for Atherosclerotic disease: Hypercholesterolemia, Hypertension, Cigarette smoking  Risk Factors: > 3 Risk factors or history of atherosclerotic disease*  Troponin: < 1X normal limit  Heart Score Total: 3      PHYSICAL EXAM    (up to 7 for level 4, 8 or more for level 5)     ED Triage Vitals [20 0904]   BP Temp Temp Source Pulse Resp SpO2 Height Weight   (!) 172/94 97.6 °F (36.4 °C) Temporal 75 22 98 % 5' 5\" (1.651 m) 181 lb (82.1 kg)       Physical Exam  Vitals signs and nursing note reviewed. Constitutional:       Appearance: She is well-developed. She is not diaphoretic. HENT:      Head: Normocephalic and atraumatic. Right Ear: External ear normal.      Left Ear: External ear normal.   Eyes:      General:         Right eye: No discharge. Left eye: No discharge. Neck:      Musculoskeletal: Normal range of motion and neck supple. Vascular: No JVD. Cardiovascular:      Rate and Rhythm: Normal rate and regular rhythm. Pulses: Normal pulses. Heart sounds: Normal heart sounds. Pulmonary:      Effort: Pulmonary effort is normal. No respiratory distress. Breath sounds: Normal breath sounds. Abdominal:      Palpations: Abdomen is soft.    Musculoskeletal: Normal range of motion. Skin:     General: Skin is warm and dry. Coloration: Skin is not pale. Neurological:      Mental Status: She is alert and oriented to person, place, and time. Psychiatric:         Behavior: Behavior normal.         DIAGNOSTIC RESULTS   LABS:    Labs Reviewed   COMPREHENSIVE METABOLIC PANEL - Abnormal; Notable for the following components:       Result Value    Glucose 109 (*)     AST 13 (*)     All other components within normal limits    Narrative:     Performed at:  [facility] [address], [employer]   Phone [phone]   CBC WITH AUTO DIFFERENTIAL    Narrative:     Performed at:  [facility] [address], [address]   Phone [phone]   TROPONIN    Narrative:     Performed at:  [facility] [address], [employer]   Phone [phone]   BRAIN NATRIURETIC PEPTIDE    Narrative:     Performed at:  [facility] [address], [employer]   Phone [phone]   HCG, SERUM, QUALITATIVE    Narrative:     Performed at:  [facility] [address], [employer]   Phone [phone]   TROPONIN    Narrative:     Performed at:  [facility] [address], [employer]   Phone [phone]       All other labs were within normal range or not returned as of this dictation. EKG: All EKG's are interpreted by the Emergency Department Physician in the absence of a cardiologist.  Please see their note for interpretation of EKG.       RADIOLOGY:   Non-plain film images such as CT, Ultrasound and MRI are read by the radiologist. Plain radiographic images are visualized and preliminarily interpreted by the ED Provider with the below findings:        Interpretation per the Radiologist below, if available at the time of this note:    CT CHEST ABDOMEN PELVIS W CONTRAST   Final Result   1. Esophagitis. 2. Negative CT of the abdomen and pelvis. XR CHEST PORTABLE   Final Result   No acute cardiopulmonary process. No results found. PROCEDURES   Unless otherwise noted below, none     Procedures    CRITICAL CARE TIME   The total critical care time spent while evaluating and treating this patient was at least 32 minutes. This excludes time spent doing separately billable procedures. This includes time at the bedside, data interpretation, medication management, obtaining critical history from collateral sources if the patient is unable to provide it directly, and physician consultation. Specifics of interventions taken and potentially life-threatening diagnostic considerations are listed above in the medical decision making. CONSULTS:  None      EMERGENCY DEPARTMENT COURSE and DIFFERENTIAL DIAGNOSIS/MDM:   Vitals:    Vitals:    07/13/20 1415 07/13/20 1430 07/13/20 1445 07/13/20 1515   BP: (!) 102/54 133/65 (!) 109/46 (!) 103/58   Pulse: 59 50 (!) 49 (!) 47   Resp: 13 11 13 10   Temp:       TempSrc:       SpO2: 98% 96% 96% 96%   Weight:       Height:           Patient was given the following medications:  Medications   morphine injection 4 mg (4 mg Intravenous Given 7/13/20 1018)   aspirin chewable tablet 324 mg (324 mg Oral Given 7/13/20 1018)   iopamidol (ISOVUE-370) 76 % injection 75 mL (75 mLs Intravenous Given 7/13/20 1107)   HYDROmorphone HCl PF (DILAUDID) injection 0.5 mg (0.5 mg Intravenous Given 7/13/20 1353)           Briefly, this is a 52year old female that  presents to the ER with a complaint of midsternal chest pain since 0830 this morning after leaving the doctors office. States that pain has been constant and radiates to her back, describes as gripping. No mitigating or exacerbating factors. She did have the fluid removed from her lap band (placed 2011) last week, by DR. Christina arana.   States that she was vomiting and having trouble with digestion, that has since resolved. CBC is unremarkable. CMP unremarkable. Troponin negative. Pregnancy test negative. BNP 49.    CT CHEST ABDOMEN PELVIS W CONTRAST (Final result)   Result time 07/13/20 11:37:41   Final result by Amy Rivera MD (07/13/20 11:37:41)                 Impression:     1. Esophagitis. 2. Negative CT of the abdomen and pelvis. Patient is pain-free. She was given aspirin and morphine in the ER. Chest pain screen is 3. Delta troponin is negative. The patient has been evaluated and the history and physical exam suggest a benign etiology. I see nothing to suggest acute coronary syndrome, myocardial infarction, pulmonary embolism, thoracic aortic dissection, significant pericarditis, pneumonia, pneumothorax, or acute abdomen. I feel the patient can be safely discharged to home with outpatient follow up. Instructions have been given for the patient to return to the Emergency Department for any worsening of the symptoms, including but not limited to increased pain, shortness of breath, abdominal pain or weakness. FINAL IMPRESSION      1.  Chest pain, unspecified type          DISPOSITION/PLAN   DISPOSITION        PATIENT REFERREDTO:  Michaelle Crook MD  [address]  [phone]    Schedule an appointment as soon as possible for a visit         DISCHARGE MEDICATIONS:  Discharge Medication List as of 7/13/2020  3:26 PM      START taking these medications    Details   sucralfate (CARAFATE) 1 GM tablet Take 1 tablet by mouth 4 times daily, Disp-120 tablet,R-3Print             DISCONTINUED MEDICATIONS:  Discharge Medication List as of 7/13/2020  3:26 PM                 (Please note that portions of this note were completed with a voice recognition program.  Efforts were made to edit the dictations but occasionally words are mis-transcribed.)    Jaki López, SHAKA - CNP (electronically signed)            SHAKA Reyes - KHANH  07/13/20 9917

## 2020-07-13 NOTE — ED NOTES
Pt d/c instructions given, v/u. New scripts for home discussed, pt v/u. IV removed without complications. A&O with no signs of distress. Wheel chair offered, pt declined. Denies needs at this time. Pt ambulated to exit.         Paolo Britt RN  07/13/20 8395

## 2020-07-14 LAB
EKG ATRIAL RATE: 54 BPM
EKG ATRIAL RATE: 71 BPM
EKG DIAGNOSIS: NORMAL
EKG DIAGNOSIS: NORMAL
EKG P AXIS: 17 DEGREES
EKG P AXIS: 26 DEGREES
EKG P-R INTERVAL: 132 MS
EKG P-R INTERVAL: 132 MS
EKG Q-T INTERVAL: 394 MS
EKG Q-T INTERVAL: 464 MS
EKG QRS DURATION: 62 MS
EKG QRS DURATION: 64 MS
EKG QTC CALCULATION (BAZETT): 428 MS
EKG QTC CALCULATION (BAZETT): 440 MS
EKG R AXIS: 0 DEGREES
EKG R AXIS: 7 DEGREES
EKG T AXIS: 24 DEGREES
EKG T AXIS: 35 DEGREES
EKG VENTRICULAR RATE: 54 BPM
EKG VENTRICULAR RATE: 71 BPM

## 2020-07-14 PROCEDURE — 93010 ELECTROCARDIOGRAM REPORT: CPT | Performed by: INTERNAL MEDICINE

## 2020-07-14 NOTE — ED PROVIDER NOTES
As physician-in-triage, I performed a medical screening history and physical exam on [address]. I also cared for and evaluated the patient in conjunction with the ED Advanced Practice Provider. All diagnostic, treatment, and disposition decisions were made by myself in conjunction with the advanced practice provider. For all further details of the patient's emergency department visit, please see the advanced practice provider's documentation. Patient presents to the ER for evaluation positive chest pain pleuritic increasing abdominal pain with LAP-BAND with recent adjustment, she is evidence of underlying esophagitis, with no evidence of LAP-BAND dysfunction no evidence of PE infiltrate or abscess. EKG reveals no acute ST segment changes cardiac enzymes are negative, possible underlying esophagitis. Outpatient follow-up with cardiology, return if worse or new symptoms. Analgesia provided. Impression: Acute esophagitis.   Atypical chest pain     Dina Pierre MD  16/68/20 9190

## 2020-08-06 ENCOUNTER — OFFICE VISIT (OUTPATIENT)
Dept: PRIMARY CARE CLINIC | Age: 50
End: 2020-08-06
Payer: COMMERCIAL

## 2020-08-06 LAB — S PYO AG THROAT QL: NORMAL

## 2020-08-06 PROCEDURE — 87880 STREP A ASSAY W/OPTIC: CPT | Performed by: NURSE PRACTITIONER

## 2020-08-06 NOTE — PATIENT INSTRUCTIONS

## 2020-08-06 NOTE — PROGRESS NOTES
Susana Shah received a viral test for COVID-19. They were educated on isolation and quarantine as appropriate. For any symptoms, they were directed to seek care from their PCP, given contact information to establish with a doctor, directed to an urgent care or the emergency room.

## 2020-08-08 LAB — SARS-COV-2, NAA: NOT DETECTED

## 2020-11-02 ENCOUNTER — TELEPHONE (OUTPATIENT)
Dept: FAMILY MEDICINE CLINIC | Age: 50
End: 2020-11-02

## 2021-09-13 LAB — FOLLICLE STIMULATING HORMONE: 40 MIU/ML

## 2021-10-13 ENCOUNTER — HOSPITAL ENCOUNTER (OUTPATIENT)
Dept: WOMENS IMAGING | Age: 51
Discharge: HOME | End: 2021-10-13
Payer: COMMERCIAL

## 2021-10-13 DIAGNOSIS — Z12.31 BREAST CANCER SCREENING BY MAMMOGRAM: ICD-10-CM

## 2021-10-13 PROCEDURE — 77067 SCR MAMMO BI INCL CAD: CPT

## 2021-10-14 ENCOUNTER — TELEPHONE (OUTPATIENT)
Dept: WOMENS IMAGING | Age: 51
End: 2021-10-14

## 2021-10-14 NOTE — TELEPHONE ENCOUNTER
Mammo tech reviewed screening mammogram results and recommendations for additional imaging. Given central Scheduling number to call for assistance as patient wants first available appointment, ASAP, at any [facility] facility.

## 2021-11-08 ENCOUNTER — HOSPITAL ENCOUNTER (OUTPATIENT)
Dept: WOMENS IMAGING | Age: 51
Discharge: HOME | End: 2021-11-08
Payer: COMMERCIAL

## 2021-11-08 ENCOUNTER — HOSPITAL ENCOUNTER (OUTPATIENT)
Dept: ULTRASOUND IMAGING | Age: 51
End: 2021-11-08
Payer: COMMERCIAL

## 2021-11-08 ENCOUNTER — PRE-PROCEDURE TELEPHONE (OUTPATIENT)
Dept: WOMENS IMAGING | Age: 51
End: 2021-11-08

## 2021-11-08 DIAGNOSIS — R92.8 ABNORMAL MAMMOGRAM: ICD-10-CM

## 2021-11-08 PROCEDURE — G0279 TOMOSYNTHESIS, MAMMO: HCPCS

## 2021-11-08 NOTE — TELEPHONE ENCOUNTER
Nurse Navigator reviewed pre-procedure stereo breast biopsy patient education information in person and gave written instructions. Reviewed medications and need to hold all blood thinners per instructions. None to hold. Patient should take all other medications as prescribed. Patient to eat and drink as normal prior to the procedure. Wear a bra with good support and a two piece outfit for comfort. Patient can bring someone with you but you can also drive yourself. Plan on being at the breast center for 2-2 and a half hours. Reviewed the process of a biopsy - sitting in a chair with your breast compressed similar to a mammogram with frequent images taken throughout the procedure. The skin is cleaned and a local anesthetic is given to numb the area. A small skin nick is made for the biopsy needle and once in place, samples are taken and then xrayed for confirmation. A tiny tissue marker is then placed inside your breast at the site of the biopsy for future reference. Then pressure is hold on the biopsy site to stop the bleeding and a bandage and waterproof dressing is applied. A mammogram is then done to validate the tissue marker. The tissue sample is sent to pathology. Results will come back in 2-3 business days and sent to your referring physician. Either they or the nurse navigator will call you with the results and recommended  follow up needed  Patient verbalizes understanding.

## 2021-11-15 ENCOUNTER — HOSPITAL ENCOUNTER (OUTPATIENT)
Dept: WOMENS IMAGING | Age: 51
Discharge: HOME | End: 2021-11-15
Payer: COMMERCIAL

## 2021-11-15 DIAGNOSIS — R92.2 BREAST DENSITY: ICD-10-CM

## 2021-11-15 DIAGNOSIS — R92.8 ABNORMAL MAMMOGRAM: ICD-10-CM

## 2021-11-15 PROCEDURE — 77065 DX MAMMO INCL CAD UNI: CPT

## 2021-11-15 PROCEDURE — 2720000010 MAM STEREO BREAST BX W LOC DEVICE 1ST LESION LEFT

## 2021-11-15 PROCEDURE — 88305 TISSUE EXAM BY PATHOLOGIST: CPT

## 2021-11-15 PROCEDURE — 2500000003 HC RX 250 WO HCPCS: Performed by: INTERNAL MEDICINE

## 2021-11-15 PROCEDURE — 76098 X-RAY EXAM SURGICAL SPECIMEN: CPT

## 2021-11-15 RX ORDER — LIDOCAINE HYDROCHLORIDE AND EPINEPHRINE 10; 10 MG/ML; UG/ML
20 INJECTION, SOLUTION INFILTRATION; PERINEURAL ONCE
Status: CANCELLED | OUTPATIENT
Start: 2021-11-15 | End: 2021-11-15

## 2021-11-15 RX ORDER — LIDOCAINE HYDROCHLORIDE AND EPINEPHRINE 10; 10 MG/ML; UG/ML
20 INJECTION, SOLUTION INFILTRATION; PERINEURAL ONCE
Status: COMPLETED | OUTPATIENT
Start: 2021-11-15 | End: 2021-11-15

## 2021-11-15 RX ORDER — LIDOCAINE HYDROCHLORIDE 10 MG/ML
5 INJECTION, SOLUTION EPIDURAL; INFILTRATION; INTRACAUDAL; PERINEURAL ONCE
Status: CANCELLED | OUTPATIENT
Start: 2021-11-15 | End: 2021-11-15

## 2021-11-15 RX ORDER — LIDOCAINE HYDROCHLORIDE 10 MG/ML
5 INJECTION, SOLUTION EPIDURAL; INFILTRATION; INTRACAUDAL; PERINEURAL ONCE
Status: COMPLETED | OUTPATIENT
Start: 2021-11-15 | End: 2021-11-15

## 2021-11-15 RX ADMIN — LIDOCAINE HYDROCHLORIDE,EPINEPHRINE BITARTRATE 20 ML: 10; .01 INJECTION, SOLUTION INFILTRATION; PERINEURAL at 12:47

## 2021-11-15 RX ADMIN — LIDOCAINE HYDROCHLORIDE 5 ML: 10 INJECTION, SOLUTION EPIDURAL; INFILTRATION; INTRACAUDAL; PERINEURAL at 12:55

## 2021-11-15 RX ADMIN — LIDOCAINE HYDROCHLORIDE 4 ML: 10 INJECTION, SOLUTION EPIDURAL; INFILTRATION; INTRACAUDAL; PERINEURAL at 12:46

## 2021-11-15 ASSESSMENT — PAIN SCALES - GENERAL: PAINLEVEL_OUTOF10: 0

## 2021-11-15 NOTE — PROGRESS NOTES
Patient here for breast biopsy. NN reviewed the health history, allergies and medications. Family member daughter Taveras Side with her. Radiologist reviews procedure with patient, consent signed. Patient tolerates procedure well. Compression held. Site cleansed with chloraprep, steri strips and dry dressing applied. Ice pack in place. Reviewed discharge instructions with patient and signed copy. Patient verbalized understanding and agreed to contact Nurse Navigator with any questions. Patient A&Ox3, steady on feet and discharged home.

## 2021-11-16 ENCOUNTER — FOLLOWUP TELEPHONE ENCOUNTER (OUTPATIENT)
Dept: WOMENS IMAGING | Age: 51
End: 2021-11-16

## 2021-11-22 LAB
HEPATITIS B SURFACE ANTIGEN INTERPRETATION: NORMAL
HEPATITIS C ANTIBODY INTERPRETATION: NORMAL

## 2021-11-23 LAB
HIV AG/AB: NORMAL
HIV ANTIGEN: NORMAL
HIV-1 ANTIBODY: NORMAL
HIV-2 AB: NORMAL
TOTAL SYPHILLIS IGG/IGM: NORMAL

## 2021-11-24 LAB — URINE CULTURE, ROUTINE: NORMAL

## 2022-01-17 ENCOUNTER — TELEPHONE (OUTPATIENT)
Dept: WOMENS IMAGING | Age: 52
End: 2022-01-17

## 2022-01-20 ENCOUNTER — TELEPHONE (OUTPATIENT)
Dept: WOMENS IMAGING | Age: 52
End: 2022-01-20

## 2022-01-20 NOTE — TELEPHONE ENCOUNTER
NN called [facility] after not hear back, said she took advil and tried the moist warm packs with some relief but still remains uncomfortable, states area of discomfort seems to have moved  Bit toward sternum and kept her up last night so NN will reach out to radiologist for further recommendations.

## 2022-01-20 NOTE — TELEPHONE ENCOUNTER
Gloria contacted NN to discuss some ongoing post biopsy achyness, constant that is not resolving. We discussed that it may still be a hematoma, discussed possible OTC medication, antiinflammatory if she tolerates and to try some moist warm packs on the area on/off and then to get back to me to see if helping and that NN could also reach out to radiologist for further f/u. NO signs of any infection, bruise has resolved, no redness, warmth, fever, just pain that is fairly constant, achy and sometimes keeps her up at night. It is not increasing. Given NN contact for her to update.

## 2022-06-09 ENCOUNTER — HOSPITAL ENCOUNTER (OUTPATIENT)
Dept: ULTRASOUND IMAGING | Age: 52
Discharge: HOME | End: 2022-06-09
Payer: COMMERCIAL

## 2022-06-09 ENCOUNTER — HOSPITAL ENCOUNTER (OUTPATIENT)
Dept: WOMENS IMAGING | Age: 52
Discharge: HOME | End: 2022-06-09
Payer: COMMERCIAL

## 2022-06-09 VITALS — WEIGHT: 220 LBS | BODY MASS INDEX: 37.56 KG/M2 | HEIGHT: 64 IN

## 2022-06-09 DIAGNOSIS — Z12.31 SCREENING MAMMOGRAM FOR HIGH-RISK PATIENT: ICD-10-CM

## 2022-06-09 DIAGNOSIS — R92.8 ABNORMAL MAMMOGRAM: ICD-10-CM

## 2022-06-09 DIAGNOSIS — N63.0 LUMP OR MASS IN BREAST: ICD-10-CM

## 2022-06-09 DIAGNOSIS — N64.4 BREAST PAIN: ICD-10-CM

## 2022-06-09 PROCEDURE — 77066 DX MAMMO INCL CAD BI: CPT

## 2022-06-09 PROCEDURE — 76642 ULTRASOUND BREAST LIMITED: CPT

## 2022-06-22 ENCOUNTER — HOSPITAL ENCOUNTER (OUTPATIENT)
Dept: WOMENS IMAGING | Age: 52
Discharge: HOME | End: 2022-06-22
Payer: COMMERCIAL

## 2022-06-22 PROCEDURE — 77066 DX MAMMO INCL CAD BI: CPT

## 2022-11-09 ENCOUNTER — HOSPITAL ENCOUNTER (OUTPATIENT)
Dept: NON INVASIVE DIAGNOSTICS | Age: 52
Discharge: HOME | End: 2022-11-09
Payer: COMMERCIAL

## 2022-11-09 LAB
LV EF: 66 %
LVEF MODALITY: NORMAL

## 2022-11-09 PROCEDURE — 3430000000 HC RX DIAGNOSTIC RADIOPHARMACEUTICAL: Performed by: INTERNAL MEDICINE

## 2022-11-09 PROCEDURE — 93017 CV STRESS TEST TRACING ONLY: CPT | Performed by: INTERNAL MEDICINE

## 2022-11-09 PROCEDURE — 78452 HT MUSCLE IMAGE SPECT MULT: CPT | Performed by: INTERNAL MEDICINE

## 2022-11-09 PROCEDURE — A9502 TC99M TETROFOSMIN: HCPCS | Performed by: INTERNAL MEDICINE

## 2022-11-09 RX ADMIN — TETROFOSMIN 30 MILLICURIE: 1.38 INJECTION, POWDER, LYOPHILIZED, FOR SOLUTION INTRAVENOUS at 10:30

## 2022-11-09 RX ADMIN — TETROFOSMIN 10 MILLICURIE: 1.38 INJECTION, POWDER, LYOPHILIZED, FOR SOLUTION INTRAVENOUS at 08:49

## 2022-12-01 ENCOUNTER — HOSPITAL ENCOUNTER (OUTPATIENT)
Dept: WOMENS IMAGING | Age: 52
Discharge: HOME | End: 2022-12-01
Payer: COMMERCIAL

## 2022-12-01 VITALS — WEIGHT: 225 LBS | HEIGHT: 64 IN | BODY MASS INDEX: 38.41 KG/M2

## 2022-12-01 DIAGNOSIS — Z12.31 ENCOUNTER FOR SCREENING MAMMOGRAM FOR BREAST CANCER: ICD-10-CM

## 2022-12-01 PROCEDURE — 77067 SCR MAMMO BI INCL CAD: CPT

## 2022-12-05 ENCOUNTER — HOSPITAL ENCOUNTER (OUTPATIENT)
Age: 52
Discharge: HOME | End: 2022-12-05
Payer: COMMERCIAL

## 2022-12-05 ENCOUNTER — HOSPITAL ENCOUNTER (OUTPATIENT)
Dept: GENERAL RADIOLOGY | Age: 52
Discharge: HOME | End: 2022-12-05
Payer: COMMERCIAL

## 2022-12-05 DIAGNOSIS — M54.6 PAIN IN THORACIC SPINE: ICD-10-CM

## 2022-12-05 PROCEDURE — 72070 X-RAY EXAM THORAC SPINE 2VWS: CPT

## 2022-12-05 PROCEDURE — 72040 X-RAY EXAM NECK SPINE 2-3 VW: CPT

## 2023-02-21 ENCOUNTER — TELEPHONE (OUTPATIENT)
Dept: BARIATRICS/WEIGHT MGMT | Age: 53
End: 2023-02-21

## 2023-02-21 NOTE — TELEPHONE ENCOUNTER
Patient was sent Dr. Casandra Lane digital bariatric seminar. Patient DOES have BWLS coverage with [employer] (No specified diet) Bariatric benefit form scanned in media.

## 2023-02-21 NOTE — TELEPHONE ENCOUNTER
Seminar Date: 2.7.23    Presenter: ALBERT    Insurance Type:bcbs     BWLS Covered:y    Medically Supervised Diet Req:n    Length of time: 0    Has patient had prev bariatric or gastric surgeries : Lap band 2011    Is patient's BMI lower than 35 : n    If yes, BMI :    Does patient have dx of diabetes :y    *If previous bariatric or gastric surgeries, please do not schedule until speaking with the provider*

## 2023-03-13 ENCOUNTER — TELEPHONE (OUTPATIENT)
Dept: BARIATRICS/WEIGHT MGMT | Age: 53
End: 2023-03-13

## 2023-03-13 NOTE — TELEPHONE ENCOUNTER
Called as a new pt courtesy call - left message. Told patient to have new pt paperwork completely filled out, insurance card, and id and to arrive on time at [facility]. If they didn't have the paperwork filled out and arrive on time may be rescheduled. Also stated if they didn't receive paperwork to let us know so we could get it to them another way. Left office number on message.

## 2023-03-15 ENCOUNTER — OFFICE VISIT (OUTPATIENT)
Dept: BARIATRICS/WEIGHT MGMT | Age: 53
End: 2023-03-15
Payer: COMMERCIAL

## 2023-03-15 VITALS
HEIGHT: 64 IN | SYSTOLIC BLOOD PRESSURE: 118 MMHG | WEIGHT: 214 LBS | HEART RATE: 78 BPM | DIASTOLIC BLOOD PRESSURE: 74 MMHG | BODY MASS INDEX: 36.54 KG/M2

## 2023-03-15 DIAGNOSIS — E66.01 SEVERE OBESITY (BMI 35.0-35.9 WITH COMORBIDITY) (HCC): Primary | ICD-10-CM

## 2023-03-15 DIAGNOSIS — K21.9 GASTROESOPHAGEAL REFLUX DISEASE, UNSPECIFIED WHETHER ESOPHAGITIS PRESENT: ICD-10-CM

## 2023-03-15 DIAGNOSIS — Z98.84 LAP-BAND SURGERY STATUS: ICD-10-CM

## 2023-03-15 DIAGNOSIS — E66.9 DIABETES MELLITUS TYPE 2 IN OBESE (HCC): ICD-10-CM

## 2023-03-15 DIAGNOSIS — E11.69 DIABETES MELLITUS TYPE 2 IN OBESE (HCC): ICD-10-CM

## 2023-03-15 DIAGNOSIS — E78.2 MIXED HYPERLIPIDEMIA: ICD-10-CM

## 2023-03-15 PROCEDURE — 3078F DIAST BP <80 MM HG: CPT | Performed by: SURGERY

## 2023-03-15 PROCEDURE — 3074F SYST BP LT 130 MM HG: CPT | Performed by: SURGERY

## 2023-03-15 PROCEDURE — 99204 OFFICE O/P NEW MOD 45 MIN: CPT | Performed by: SURGERY

## 2023-03-15 RX ORDER — FOLIC ACID 1 MG/1
1 TABLET ORAL DAILY
COMMUNITY

## 2023-03-15 RX ORDER — TIRZEPATIDE 7.5 MG/.5ML
7.5 INJECTION, SOLUTION SUBCUTANEOUS WEEKLY
COMMUNITY

## 2023-03-15 RX ORDER — ATORVASTATIN CALCIUM 10 MG/1
10 TABLET, FILM COATED ORAL DAILY
COMMUNITY

## 2023-03-15 RX ORDER — CHOLECALCIFEROL (VITAMIN D3) 1250 MCG
CAPSULE ORAL
COMMUNITY

## 2023-03-15 RX ORDER — FENOFIBRATE 134 MG/1
CAPSULE ORAL
COMMUNITY
Start: 2023-01-10

## 2023-03-15 RX ORDER — CETIRIZINE HYDROCHLORIDE 10 MG/1
10 TABLET ORAL DAILY
COMMUNITY

## 2023-03-15 NOTE — PROGRESS NOTES
Amie George is a 48 y.o. female with a date of birth of 1970. Vitals:    03/15/23 0807   BP: 118/74   Pulse: 78   BMI: Body mass index is 36.73 kg/m². Obesity Classification: Class II    Weight History: Wt Readings from Last 3 Encounters:   03/15/23 214 lb (97.1 kg)   12/01/22 225 lb (102.1 kg)   06/09/22 220 lb (99.8 kg)     Patient's lowest adult weight was 145 lbs at age late 35s, after lap band. Patient's highest adult weight was 278 lbs at age early 35s, prior to lap band. Patient has participated in the following weight loss programs: Jeris Parody, [employer], [employer], [employer], [employer], [employer], [employer], [employer] and [place]. Patient has participated in meal replacement/liquid diets. Patient has participated in weight loss medications. *Lap Band placed in 2011, drained about 2 yrs ago d/t N/V. Occasional pain / pulling now. Patient is not lactose intolerant. Patient does not have Latter-day/cultural food concerns. Patient does not have food allergies. Patient does tolerate artificial sweeteners. 24 hour recall/food frequency chart:  Breakfast: yes. sausage/egg/cheese croissant  Snack: yes. 1/2 cup mixed nuts  Lunch: yes. chicken salad sandwich  Snack: yes. triscuits w/ cheese (10)  Dinner: yes. enchilada bake  Snack: yes. 5 thin mint girl  cookies  Drinks throughout the day: water / coffee w/ sf creamer / coke zero (1 daily) / powerade or gatorade (\"if she is hungover\")   Do you drink alcohol? Yes. - in a [employer] club, drinks 1x/wk, tastings    Patient does meet the criteria for binge eating disorder. Patient does have grazing. Patient does not have night eating. Patient does have a history of emotional eating or eating out of boredom. Surgery  Patient does feel confident in her ability to make these changes. The patient's expectations of post-surgical eating habits - voices understanding.     Patient states she does understand the consequences of not complying with post-op food guidelines. Patient states she does understands the long term changes in food intake that will be necessary for all occasions after surgery for the rest of her life. Patient is cautiously deemed nutritionally appropriate to proceed - states she is really into the bourbon tasting but will figure ways around it. Goals  Weight: 135-145 lb  Health Improvement: cholesterol / triglycerides / JUSTINA / overall health / DM    Assessment  Nutritional Needs: RMR=(9.99 x 97) + (6.25 x 163) - (4.92 x 53 y.o.) -161 = 1576 kcal x 1.3 (sedentary activity factor)= 2048 kcal - 1000 (for 2 lb weight loss/week)= 1048 kcal.    Plan  Plan/Recommendations: Start presurgical guidelines. Goals:   -Eat 4-5 times daily  -Avoid high fat and high sugar foods  -Include protein with all meals and snacks  -Avoid carbonation and caffeine  -Avoid calorie containing beverages  -Increase physical activity as tolerated    PES Statement:  Overweight/Obesity related to lack of exercise, sedentary lifestyle, unhealthy eating habits, and unsuccessful diet attempts as evidenced by BMI. Body mass index is 36.73 kg/m². Will follow up as necessary.     Mirtha Wilson, KERRIE, LD

## 2023-03-15 NOTE — PROGRESS NOTES
[facility]) Physicians   General & Laparoscopic Surgery  Weight Management Solutions      HPI:    Hany Dubose is a very pleasant 48 y.o. obese female ,   Body mass index is 36.73 kg/m². And multiple medical problems who is presenting for weight loss surgery evaluation and consultation     Patient has been struggling for several years now with obesity. Patient feels the weight is an obstacle to achieve and perform things in daily living as well risk on health. Tries to diet, and exercise but can't keep the weight off. Patient tried other regimens, but with no sustainable weight loss. Patient  is very determined to lose weight and be healthy, and is interested in surgical weight loss to achieve this goal.    Otherwise patient denies any nausea, vomiting, fevers, chills, shortness of breath, chest pain, constipation or urinary symptoms.       Morbid obesity and co-morbidities related to it are a threat to bodily function    Had lap band placed in   Lost some weight but regained it  +Tobacco use daily    Past Medical History:   Diagnosis Date    Anxiety     Asthma     Depression     Diabetes mellitus (HCC)     GERD (gastroesophageal reflux disease)     Hyperlipidemia     Hypertension     Migraine     Nausea & vomiting     Obesity     Obstructive sleep apnea     Pre-operative clearance     S/P bariatric surgery 2011     Past Surgical History:   Procedure Laterality Date    ACROMIOPLASTY      L Shoulder    CERVICAL FUSION  1990    CERVIX SURGERY  1995    Conization      SECTION      COLPOSCOPY      ENDOMETRIAL ABLATION  2009    KIDNEY STONE REMOVAL      LAP BAND  2011    LIPOSUCTION      ERNESTO STEROTACTIC LOC BREAST BIOPSY LEFT Left 11/15/2021    ERNESTO STEROTACTIC LOC BREAST BIOPSY LEFT 11/15/2021 [facility] [address]     Family History   Problem Relation Age of Onset    Diabetes Mother     Depression Father     Alcohol Abuse Father     Elevated Lipids Paternal Grandmother     Hypertension Paternal Grandmother     Heart Disease Paternal Grandmother     High Blood Pressure Paternal Grandmother     Stroke Paternal Grandmother     Diabetes Paternal Grandmother     Arthritis Paternal Grandmother     Clotting Disorder Paternal Grandmother     Glaucoma Paternal Grandmother     Hypertension Paternal Grandfather     Heart Disease Paternal Grandfather     High Blood Pressure Paternal Grandfather     Stroke Paternal Grandfather     Diabetes Paternal Grandfather     Arthritis Paternal Grandfather     Clotting Disorder Paternal Grandfather     Glaucoma Paternal Grandfather      Social History     Tobacco Use    Smoking status: Every Day     Packs/day: 1.00     Years: 20.00     Pack years: 20.00     Types: Cigarettes     Last attempt to quit: 2013     Years since quittin.2    Smokeless tobacco: Never   Substance Use Topics    Alcohol use: Yes     Comment: RARELY     I counseled the patient on the importance of not smoking and risks of ETOH. Allergies   Allergen Reactions    Percocet [Oxycodone-Acetaminophen] Itching    Sulfa Antibiotics Hives    Sulfasalazine Hives    Ace Inhibitors Other (See Comments), Hives and Rash    Codeine Nausea And Vomiting and Nausea Only    Oxycodone-Acetaminophen Hives, Itching and Nausea And Vomiting     Vitals:    03/15/23 0807   BP: 118/74   Pulse: 78   Weight: 214 lb (97.1 kg)   Height: 5' 4\" (1.626 m)       Body mass index is 36.73 kg/m².       Current Outpatient Medications:     fenofibrate micronized (LOFIBRA) 134 MG capsule, TAKE 1 CAPLET BY MOUTH ONCE DAILY, Disp: , Rfl:     Cholecalciferol (VITAMIN D3) 1.25 MG (84236 UT) CAPS, Take by mouth, Disp: , Rfl:     folic acid (FOLVITE) 1 MG tablet, Take 1 mg by mouth daily, Disp: , Rfl:     atorvastatin (LIPITOR) 10 MG tablet, Take 10 mg by mouth daily, Disp: , Rfl:     cetirizine (ZYRTEC) 10 MG tablet, Take 10 mg by mouth daily, Disp: , Rfl:     Tirzepatide (MOUNJARO) 7.5 MG/0.5ML SOPN SC injection, Inject 7.5 mg into the skin once a week, Disp: , Rfl:     montelukast (SINGULAIR) 10 MG tablet, Take 10 mg by mouth nightly, Disp: , Rfl:     albuterol (PROVENTIL) (2.5 MG/3ML) 0.083% nebulizer solution, Inhale 2.5 mg into the lungs every 4 hours as needed, Disp: , Rfl:       Review of Systems - History obtained from the patient  General ROS: negative  Psychological ROS: negative  Ophthalmic ROS: negative  Neurological ROS: negative  ENT ROS: negative  Allergy and Immunology ROS: negative  Hematological and Lymphatic ROS: negative  Endocrine ROS: negative  Respiratory ROS: negative  Cardiovascular ROS: negative  Gastrointestinal ROS:negative  Genito-Urinary ROS: negative  Musculoskeletal ROS: negative   Skin ROS: negative    Physical Exam   Constitutional: Patient is oriented to person, place, and time. Vital signs are normal. Patient  appears well-developed and well-nourished. Patient  is active and cooperative. Non-toxic appearance. No distress. HENT:   Head: Normocephalic and atraumatic. Head is without laceration. Right Ear: External ear normal. No lacerations. No drainage, swelling or tenderness. Left Ear: External ear normal. No lacerations. No drainage, swelling or tenderness. Nose: Nose normal. No nose lacerations or nasal deformity. Mouth/Throat: Uvula is midline, oropharynx is clear and moist and mucous membranes are normal. No oropharyngeal exudate. Eyes: Conjunctivae, EOM and lids are normal. Pupils are equal, round, and reactive to light. Right eye exhibits no discharge. No foreign body present in the right eye. Left eye exhibits no discharge. No foreign body present in the left eye. No scleral icterus. Neck: Trachea normal and normal range of motion. Neck supple. No JVD present. No tracheal tenderness present. Carotid bruit is not present. No rigidity. No tracheal deviation and no edema present. No thyromegaly present.    Cardiovascular: Normal rate, regular rhythm, normal heart sounds, intact distal pulses and normal pulses. Pulmonary/Chest: Effort normal and breath sounds normal. No stridor. No respiratory distress. Patient  has no wheezes. Patient has no rales. Patient exhibits no tenderness and no crepitus. Abdominal: Soft. Normal appearance and bowel sounds are normal. Patient exhibits no distension, no abdominal bruit, no ascites and no mass. There is no hepatosplenomegaly. There is no tenderness. There is no rigidity, no rebound, no guarding and no CVA tenderness. No hernia. Hernia confirmed negative in the ventral area. Musculoskeletal: Normal range of motion. Patient exhibits no edema or tenderness. Lymphadenopathy:        Head (right side): No submental, no submandibular, no preauricular, no posterior auricular and no occipital adenopathy present. Head (left side): No submental, no submandibular, no preauricular, no posterior auricular and no occipital adenopathy present. Patient  has no cervical adenopathy. Right: No supraclavicular adenopathy present. Left: No supraclavicular adenopathy present. Neurological: Patient is alert and oriented to person, place, and time. Patient has normal strength. Coordination and gait normal. GCS eye subscore is 4. GCS verbal subscore is 5. GCS motor subscore is 6. Skin: Skin is warm and dry. No abrasion and no rash noted. Patient  is not diaphoretic. No cyanosis or erythema. Psychiatric: Patient has a normal mood and affect. speech is normal and behavior is normal. Cognition and memory are normal.             A/P  Aaron Arana is a very pleasant 48 y.o. female with Obesity,  Body mass index is 36.73 kg/m². and multiple obesity related co-morbidities. Aaron Arana is very motivated to lose weight and being more healthy. We discussed how her weight affects her overall health including:  Dwaineholly Bruce was seen today for bariatric, initial visit.     Diagnoses and all orders for this visit:    Severe obesity (BMI 35.0-35.9 with comorbidity) ([address]  -     CBC with Auto Differential; Future  -     Comprehensive Metabolic Panel; Future  -     Hemoglobin A1C; Future  -     Iron and TIBC; Future  -     Lipid Panel; Future  -     TSH with Reflex; Future  -     Vitamin B12; Future  -     Vitamin D 25 Hydroxy; Future  -     Urine Drug Screen; Future  -     Ethanol; Future  -     Nicotine and Metabolites; Future  -     [facility]    Mixed hyperlipidemia  -     CBC with Auto Differential; Future  -     Comprehensive Metabolic Panel; Future  -     Hemoglobin A1C; Future  -     Iron and TIBC; Future  -     Lipid Panel; Future  -     TSH with Reflex; Future  -     Vitamin B12; Future  -     Vitamin D 25 Hydroxy; Future  -     Urine Drug Screen; Future  -     Ethanol; Future  -     Nicotine and Metabolites; Future  -     Spike Rogers MD, Cardiology, [facility]    Gastroesophageal reflux disease, unspecified whether esophagitis present  -     CBC with Auto Differential; Future  -     Comprehensive Metabolic Panel; Future  -     Hemoglobin A1C; Future  -     Iron and TIBC; Future  -     Lipid Panel; Future  -     TSH with Reflex; Future  -     Vitamin B12; Future  -     Vitamin D 25 Hydroxy; Future  -     Urine Drug Screen; Future  -     Ethanol; Future  -     Nicotine and Metabolites; Future  -     Spike Rogers MD, Cardiology, [facility]    LAP-BAND surgery status  -     CBC with Auto Differential; Future  -     Comprehensive Metabolic Panel; Future  -     Hemoglobin A1C; Future  -     Iron and TIBC; Future  -     Lipid Panel; Future  -     TSH with Reflex; Future  -     Vitamin B12; Future  -     Vitamin D 25 Hydroxy; Future  -     Urine Drug Screen; Future  -     Ethanol; Future  -     Nicotine and Metabolites;  Future  -     Spike Rogers MD, Cardiology, [facility]    Diabetes mellitus type 2 in obese [facility])  -    CBC with Auto Differential; Future  -     Comprehensive Metabolic Panel; Future  -     Hemoglobin A1C; Future  -     Iron and TIBC; Future  -     Lipid Panel; Future  -     TSH with Reflex; Future  -     Vitamin B12; Future  -     Vitamin D 25 Hydroxy; Future  -     Urine Drug Screen; Future  -     Ethanol; Future  -     Nicotine and Metabolites; Future  -     Jamison Crowell MD, Cardiology, [facility]      The patient underwent 30 minutes of dietary counseling.  I have reviewed, discussed and agree with the dietary plan.  Medical weight loss and different surgical options were discussed in details with patient. Gloria Blancas is interested in surgical weight loss.  Patient is interested in lap band to Laparoscopic Sleeve Gastrectomy, which I believe is an excellent option for the patient.  We will proceed with pre-operative work up labs and studies. Will also petition patient's  insurance for approval for this procedure.   Patient received dietary handouts and education.   Patient advised that its their responsibility to follow up for studies and/or labs ordered today.   Also discussed in details the importance of follow up, as well following the recommendations and completing the whole program to improve outcomes when it comes to healthier lifestyle as well weight loss.   Patient also advised about risks and benefits being on a strict dietary regimen as well using supplements. Patient agrees and wants to proceed with weight loss planning     Explained high risk of revision and recommendation of two stage approach with band removal followed by conversion to sleeve    Labs ordered.   Cardiac Clearance.  Psych Evaluation.    H-pylori   EGD  Support Group.   PCP Letter.   Weight History    F/U in 4 weeks.    Patient advised that its their responsibility to follow up for studies and/or labs ordered today.

## 2023-03-20 DIAGNOSIS — E66.01 SEVERE OBESITY (BMI 35.0-35.9 WITH COMORBIDITY) (HCC): ICD-10-CM

## 2023-03-20 DIAGNOSIS — K21.9 GASTROESOPHAGEAL REFLUX DISEASE, UNSPECIFIED WHETHER ESOPHAGITIS PRESENT: ICD-10-CM

## 2023-03-20 DIAGNOSIS — Z98.84 LAP-BAND SURGERY STATUS: ICD-10-CM

## 2023-03-20 DIAGNOSIS — E11.69 DIABETES MELLITUS TYPE 2 IN OBESE (HCC): ICD-10-CM

## 2023-03-20 DIAGNOSIS — E78.2 MIXED HYPERLIPIDEMIA: ICD-10-CM

## 2023-03-20 DIAGNOSIS — E66.9 DIABETES MELLITUS TYPE 2 IN OBESE (HCC): ICD-10-CM

## 2023-03-20 LAB
25(OH)D3 SERPL-MCNC: 55.4 NG/ML
BASOPHILS # BLD: 0 K/UL (ref 0–0.2)
BASOPHILS NFR BLD: 0.4 %
DEPRECATED RDW RBC AUTO: 13.9 % (ref 12.4–15.4)
EOSINOPHIL # BLD: 0.2 K/UL (ref 0–0.6)
EOSINOPHIL NFR BLD: 2.1 %
HCT VFR BLD AUTO: 41.9 % (ref 36–48)
HGB BLD-MCNC: 14 G/DL (ref 12–16)
IRON SATN MFR SERPL: 15 % (ref 15–50)
IRON SERPL-MCNC: 51 UG/DL (ref 37–145)
LYMPHOCYTES # BLD: 4.1 K/UL (ref 1–5.1)
LYMPHOCYTES NFR BLD: 37.5 %
MCH RBC QN AUTO: 28.3 PG (ref 26–34)
MCHC RBC AUTO-ENTMCNC: 33.4 G/DL (ref 31–36)
MCV RBC AUTO: 84.6 FL (ref 80–100)
MONOCYTES # BLD: 0.7 K/UL (ref 0–1.3)
MONOCYTES NFR BLD: 6.2 %
NEUTROPHILS # BLD: 5.8 K/UL (ref 1.7–7.7)
NEUTROPHILS NFR BLD: 53.8 %
PLATELET # BLD AUTO: 312 K/UL (ref 135–450)
PMV BLD AUTO: 8.1 FL (ref 5–10.5)
RBC # BLD AUTO: 4.95 M/UL (ref 4–5.2)
TIBC SERPL-MCNC: 336 UG/DL (ref 260–445)
TSH SERPL DL<=0.005 MIU/L-ACNC: 3.49 UIU/ML (ref 0.27–4.2)
VIT B12 SERPL-MCNC: 524 PG/ML (ref 211–911)
WBC # BLD AUTO: 10.8 K/UL (ref 4–11)

## 2023-04-19 ENCOUNTER — OFFICE VISIT (OUTPATIENT)
Dept: CARDIOLOGY CLINIC | Age: 53
End: 2023-04-19
Payer: COMMERCIAL

## 2023-04-19 VITALS
WEIGHT: 212.6 LBS | SYSTOLIC BLOOD PRESSURE: 120 MMHG | DIASTOLIC BLOOD PRESSURE: 70 MMHG | HEART RATE: 84 BPM | BODY MASS INDEX: 36.29 KG/M2 | HEIGHT: 64 IN

## 2023-04-19 DIAGNOSIS — E78.2 MIXED HYPERLIPIDEMIA: ICD-10-CM

## 2023-04-19 DIAGNOSIS — I10 PRIMARY HYPERTENSION: ICD-10-CM

## 2023-04-19 DIAGNOSIS — Z01.810 PREOP CARDIOVASCULAR EXAM: Primary | ICD-10-CM

## 2023-04-19 PROCEDURE — 3078F DIAST BP <80 MM HG: CPT | Performed by: INTERNAL MEDICINE

## 2023-04-19 PROCEDURE — 3074F SYST BP LT 130 MM HG: CPT | Performed by: INTERNAL MEDICINE

## 2023-04-19 PROCEDURE — 99204 OFFICE O/P NEW MOD 45 MIN: CPT | Performed by: INTERNAL MEDICINE

## 2023-04-19 PROCEDURE — 93000 ELECTROCARDIOGRAM COMPLETE: CPT | Performed by: INTERNAL MEDICINE

## 2023-04-19 ASSESSMENT — ENCOUNTER SYMPTOMS
CHEST TIGHTNESS: 0
FACIAL SWELLING: 0
COLOR CHANGE: 0
ABDOMINAL DISTENTION: 0
COUGH: 0
BLOOD IN STOOL: 0
ABDOMINAL PAIN: 0
EYE DISCHARGE: 0
SHORTNESS OF BREATH: 0
BACK PAIN: 0
VOMITING: 0
WHEEZING: 0

## 2023-04-25 ENCOUNTER — HOSPITAL ENCOUNTER (OUTPATIENT)
Dept: CT IMAGING | Age: 53
Discharge: HOME | End: 2023-04-25
Payer: COMMERCIAL

## 2023-04-25 DIAGNOSIS — R10.0 ACUTE ABDOMINAL PAIN SYNDROME: ICD-10-CM

## 2023-04-25 LAB
BUN SERPL-MCNC: 9 MG/DL (ref 7–20)
CREAT SERPL-MCNC: 0.7 MG/DL (ref 0.6–1.1)
GFR SERPLBLD CREATININE-BSD FMLA CKD-EPI: >60 ML/MIN/{1.73_M2}

## 2023-04-25 PROCEDURE — 36415 COLL VENOUS BLD VENIPUNCTURE: CPT

## 2023-04-25 PROCEDURE — 74177 CT ABD & PELVIS W/CONTRAST: CPT

## 2023-04-25 PROCEDURE — 82565 ASSAY OF CREATININE: CPT

## 2023-04-25 PROCEDURE — 84520 ASSAY OF UREA NITROGEN: CPT

## 2023-04-25 PROCEDURE — 6360000004 HC RX CONTRAST MEDICATION: Performed by: INTERNAL MEDICINE

## 2023-04-25 RX ADMIN — IOPAMIDOL 75 ML: 755 INJECTION, SOLUTION INTRAVENOUS at 10:46

## 2023-04-25 RX ADMIN — IOHEXOL 50 ML: 240 INJECTION, SOLUTION INTRATHECAL; INTRAVASCULAR; INTRAVENOUS; ORAL at 10:46

## 2023-05-01 ENCOUNTER — OFFICE VISIT (OUTPATIENT)
Dept: BARIATRICS/WEIGHT MGMT | Age: 53
End: 2023-05-01
Payer: COMMERCIAL

## 2023-05-01 VITALS — BODY MASS INDEX: 35.51 KG/M2 | WEIGHT: 208 LBS | HEIGHT: 64 IN

## 2023-05-01 DIAGNOSIS — E66.01 SEVERE OBESITY (BMI 35.0-35.9 WITH COMORBIDITY) (HCC): Primary | ICD-10-CM

## 2023-05-01 DIAGNOSIS — E66.9 DIABETES MELLITUS TYPE 2 IN OBESE (HCC): ICD-10-CM

## 2023-05-01 DIAGNOSIS — E78.2 MIXED HYPERLIPIDEMIA: ICD-10-CM

## 2023-05-01 DIAGNOSIS — E11.69 DIABETES MELLITUS TYPE 2 IN OBESE (HCC): ICD-10-CM

## 2023-05-01 PROCEDURE — 99214 OFFICE O/P EST MOD 30 MIN: CPT | Performed by: SURGERY

## 2023-05-01 RX ORDER — METRONIDAZOLE 250 MG/1
250 TABLET ORAL 3 TIMES DAILY
COMMUNITY

## 2023-05-02 ENCOUNTER — TELEPHONE (OUTPATIENT)
Dept: BARIATRICS/WEIGHT MGMT | Age: 53
End: 2023-05-02

## 2023-05-02 NOTE — TELEPHONE ENCOUNTER
Care Coordinators by [address] by JUAN M [address]  Printed On: 5/2/2023  Patient Name  Margarito Dumont  Date of Birth  1970  Member ID  [id number]  Request Type  Authorization Request Type: Standard  Service  Place of Service: OP - Diagnostic [22b]  Expected Start Date: 05/08/2023  Expected End Date: 05/08/2023  Diagnosis  Primary ICD10 Code : K21.9  Description: Gastro-Esoph Reflux Disease Without Esophagitis  Additional ICD10 Code 1: Z98.84  Description: Bariatric Surgery Status  Additional ICD10 Code 2: R10.13  Description: Epigastric Pain  Procedure  Primary CPT Code : 35455  Description: Esophagogastroduodenoscopy, flexible, transoral; diagnostic, including collection of specimen(s) by brushing or washing, when performed (separate procedure)  Requested Units: 1  Unit Description: Units  Provider  Referring Provider: Dr. Ambar Briceño Status: In-Network  Phone: [phone]  Extension:   Fax: [phone]  Facility (Place of Procedure, Service)  Servicing Provider / Facility: [facility] Status: I'm not sure  Phone: [phone]  Extension:   Fax: [phone]  Person Completing This Request  First Name: Chen Godoy  Last Name: Cindy Mejia  Phone: [phone]  Extension:   Fax: [phone]  Notes:   Clinicals  Files Uploaded: Gama Garcia consult status post gastric band. pdf

## 2023-05-04 ENCOUNTER — TELEPHONE (OUTPATIENT)
Dept: BARIATRICS/WEIGHT MGMT | Age: 53
End: 2023-05-04

## 2023-05-04 DIAGNOSIS — Z01.818 PREOP TESTING: Primary | ICD-10-CM

## 2023-05-05 NOTE — PROGRESS NOTES
Alex Ware lost 6 lbs over 1 month. Noted *Lap Band placed in 2011, drained about 2 yrs ago d/t N/V. Occasional pain / pulling now. Pt had a 10 day stomach bug of vomiting/diarrhea. Recent hospital admission, needed IVF. Thought was a parasite, treated with Flagyl. Pt [address]  Breakfast: eggs mixed with CC with green onions and salsa or cheese    Snack: skips or nuts or triscuits with cheese    Lunch: chix salad sandwich (purchased from [employer]) on 1 pc bread or with crax, maybe a pickle    Snack: pretzel sticks or celery/PB or carrots/ranch   Craving crunchy stuff     Dinner: half of the time take out  [employer] with egg/cheese/croutons with blue cheese or [place] dressing on the side   1 pc Bread with cinnamon butter   Baked potato with butter, sour cream, cheese, schulz    If cooking:  Spaghetti with red sauce with frozen beef meatballs    Snack: nothing or something salty - cheezits, nuts     Recent emotional eating or eating out of boredom? no    Is pt consuming smaller portions? Using smaller paper plates   Trying to get more veggies throughout the day. Didn't grow up eating a lot of fruit    Is pt consuming at least 64 oz of fluids per day? yes a lot of  water     Is pt consuming carbonated, caffeinated, or sugary beverages? yes occasionally  coffee w/ sf creamer - weaning down  coke zero (1 daily) - eliminated since last visit    In a [employer] 1x/wk  tastings    Has pt sampled Unjury and/or Nectar protein?  Not yet      Plan/Recommendations:   Try protein powders  Focus on protein and produce  Wean soda/caffeine  Handouts: no    Perfecto Shone, MS, RD, LD
ROS: negative  Musculoskeletal ROS: negative   Skin ROS: negative    Physical Exam   Vitals Reviewed   Constitutional: Patient is oriented to person, place, and time. Patient appears well-developed and well-nourished. Patient is active and cooperative. Non-toxic appearance. No distress. Neck: Trachea normal and normal range of motion. No JVD present. Pulmonary/Chest: Effort normal. No accessory muscle usage or stridor. No apnea. No respiratory distress. Cardiovascular: Normal rate and no JVD. Abdominal: Normal appearance. Patient exhibits no distension. Abdomen is soft, obese, non tender. Musculoskeletal: Normal range of motion. Patient exhibits no edema. Neurological: Patient is alert and oriented to person, place, and time. Patient has normal strength. GCS eye subscore is 4. GCS verbal subscore is 5. GCS motor subscore is 6. Skin: Skin is warm and dry. No abrasion and no rash noted. Patient is not diaphoretic. No cyanosis or erythema. Psychiatric: Patient has a normal mood and affect. Speech is normal and behavior is normal. Cognition and memory are normal.       A/P    Farhat De is 48 y.o. female, Body mass index is 35.7 kg/m². pre surgery, has lost 6# since last visit. The patient underwent dietary counseling with registered dietician. I have reviewed, discussed and agree with the dietary plan. Patient is trying hard to keep good dietary and behavior modifications. Patient is monitoring portion sizes, food choices and liquid calories. Patient is trying to exercise regularly as much as possible. We discussed how her weight affects her overall health including:  Micanopy was seen today for obesity. Diagnoses and all orders for this visit:    Severe obesity (BMI 35.0-35.9 with comorbidity) ([address]    Mixed hyperlipidemia    Diabetes mellitus type 2 in obese ([address]       and importance of weight loss to alleviate those co morbid conditions.    I encouraged the patient to continue exercise

## 2023-05-08 ENCOUNTER — ANESTHESIA (OUTPATIENT)
Dept: ENDOSCOPY | Age: 53
End: 2023-05-08
Payer: COMMERCIAL

## 2023-05-08 ENCOUNTER — HOSPITAL ENCOUNTER (OUTPATIENT)
Age: 53
Setting detail: OUTPATIENT SURGERY
Discharge: HOME | End: 2023-05-08
Attending: SURGERY | Admitting: SURGERY
Payer: COMMERCIAL

## 2023-05-08 ENCOUNTER — ANESTHESIA EVENT (OUTPATIENT)
Dept: ENDOSCOPY | Age: 53
End: 2023-05-08
Payer: COMMERCIAL

## 2023-05-08 VITALS
HEART RATE: 74 BPM | OXYGEN SATURATION: 99 % | WEIGHT: 208 LBS | HEIGHT: 64 IN | DIASTOLIC BLOOD PRESSURE: 76 MMHG | SYSTOLIC BLOOD PRESSURE: 116 MMHG | BODY MASS INDEX: 35.51 KG/M2 | TEMPERATURE: 97.8 F | RESPIRATION RATE: 18 BRPM

## 2023-05-08 DIAGNOSIS — Z98.84 S/P BARIATRIC SURGERY: ICD-10-CM

## 2023-05-08 DIAGNOSIS — K21.9 GASTROESOPHAGEAL REFLUX DISEASE, UNSPECIFIED WHETHER ESOPHAGITIS PRESENT: ICD-10-CM

## 2023-05-08 LAB
GLUCOSE BLD-MCNC: 108 MG/DL (ref 70–99)
HCG UR QL: NEGATIVE
PERFORMED ON: ABNORMAL

## 2023-05-08 PROCEDURE — 3609012400 HC EGD TRANSORAL BIOPSY SINGLE/MULTIPLE: Performed by: SURGERY

## 2023-05-08 PROCEDURE — 3700000000 HC ANESTHESIA ATTENDED CARE: Performed by: SURGERY

## 2023-05-08 PROCEDURE — 84703 CHORIONIC GONADOTROPIN ASSAY: CPT

## 2023-05-08 PROCEDURE — 43239 EGD BIOPSY SINGLE/MULTIPLE: CPT | Performed by: SURGERY

## 2023-05-08 PROCEDURE — 7100000011 HC PHASE II RECOVERY - ADDTL 15 MIN: Performed by: SURGERY

## 2023-05-08 PROCEDURE — 2709999900 HC NON-CHARGEABLE SUPPLY: Performed by: SURGERY

## 2023-05-08 PROCEDURE — 88305 TISSUE EXAM BY PATHOLOGIST: CPT

## 2023-05-08 PROCEDURE — 2580000003 HC RX 258: Performed by: ANESTHESIOLOGY

## 2023-05-08 PROCEDURE — 88342 IMHCHEM/IMCYTCHM 1ST ANTB: CPT

## 2023-05-08 PROCEDURE — 7100000010 HC PHASE II RECOVERY - FIRST 15 MIN: Performed by: SURGERY

## 2023-05-08 RX ORDER — SODIUM CHLORIDE 0.9 % (FLUSH) 0.9 %
5-40 SYRINGE (ML) INJECTION EVERY 12 HOURS SCHEDULED
Status: DISCONTINUED | OUTPATIENT
Start: 2023-05-08 | End: 2023-05-08 | Stop reason: HOSPADM

## 2023-05-08 RX ORDER — SODIUM CHLORIDE 0.9 % (FLUSH) 0.9 %
5-40 SYRINGE (ML) INJECTION PRN
Status: DISCONTINUED | OUTPATIENT
Start: 2023-05-08 | End: 2023-05-08 | Stop reason: HOSPADM

## 2023-05-08 RX ORDER — LIDOCAINE HYDROCHLORIDE 10 MG/ML
1 INJECTION, SOLUTION EPIDURAL; INFILTRATION; INTRACAUDAL; PERINEURAL
Status: DISCONTINUED | OUTPATIENT
Start: 2023-05-08 | End: 2023-05-08 | Stop reason: HOSPADM

## 2023-05-08 RX ORDER — SODIUM CHLORIDE, SODIUM LACTATE, POTASSIUM CHLORIDE, CALCIUM CHLORIDE 600; 310; 30; 20 MG/100ML; MG/100ML; MG/100ML; MG/100ML
INJECTION, SOLUTION INTRAVENOUS CONTINUOUS
Status: DISCONTINUED | OUTPATIENT
Start: 2023-05-08 | End: 2023-05-08 | Stop reason: HOSPADM

## 2023-05-08 RX ADMIN — SODIUM CHLORIDE, POTASSIUM CHLORIDE, SODIUM LACTATE AND CALCIUM CHLORIDE: 600; 310; 30; 20 INJECTION, SOLUTION INTRAVENOUS at 10:32

## 2023-05-08 ASSESSMENT — PAIN - FUNCTIONAL ASSESSMENT: PAIN_FUNCTIONAL_ASSESSMENT: 0-10

## 2023-05-08 NOTE — OP NOTE
Esophagogastroduodenoscopy with biopsy    Indications: Pre-op gastric Surgery, rule out Gastroesophageal reflux disease. Pre-operative Diagnosis: Obesity/pre-op bariatric surgery . Post-operative Diagnosis: Lap Band Slip, Severe Chronic Superficial Gastritis    Surgeon: Jerry Aguilar    Anesthesia: MAC      Procedure Details   The patient was seen in the Holding Room. The risks, benefits, complications, treatment options, and expected outcomes were discussed with the patient. Pre-op Endoscopy recommended to rule any intragastric pathology that would interfere with proposed procedure /  And or due to current conditions. The possibilities of reaction to medication, pulmonary aspiration, perforation of viscus, bleeding, recurrent infection, the need for additional procedures, failure to diagnose a condition, and creating a complication requiring transfusion or operation were discussed with the patient. The patient concurred with the proposed plan, giving informed consent. The site of surgery properly noted/marked. The patient was taken to Endoscopy Suite, identified as Osbaldo Zhang and the procedure verified as  Esophagogastroduodenoscopy  A Time Out was held and the above information confirmed. Upper Endoscopy: An endoscope was inserted through the oropharynx into the upper esophagus. The endoscope was passed into the stomach to the level of the pylorus and passed to the duodenum where the ampulla was visualized and duodenum was normal. Then the scope was retracted back to the stomach and it was normal except for gastritis, biopsy of the antrum obtained for H. Pylori, then retroflexed and the gastroesophageal junction was inspected. There was  hiatal hernia and no evidence of Sheehan's.  Lap Band did appear to be slightly slipped upward    Findings:  Esophageal findings include:  Upper: normal Esophageal Mucosa  Lower:normal Esophageal Mucosa  Distal: normal Esophageal Mucosa    Gastric findings

## 2023-05-08 NOTE — H&P
Update History & Physical    The patient's History and Physical of May 1, 2023 was reviewed with the patient and I examined the patient. There was no change. The surgical site was confirmed by the patient and me. Plan: The risks, benefits, expected outcome, and alternative to the recommended procedure have been discussed with the patient. Patient understands and wants to proceed with the procedure.      Electronically signed by Callie Jeff DO on 5/8/2023 at 10:32 AM

## 2023-05-08 NOTE — ANESTHESIA PRE PROCEDURE
Department of Anesthesiology  Preprocedure Note       Name:  Jose Palencia   Age:  48 y.o.  :  1970                                          MRN:  [id number]         Date:  2023      Surgeon: Andra Soto): Noe Ricci DO    Procedure: Procedure(s):  EGD BIOPSY    Medications prior to admission:   Prior to Admission medications    Medication Sig Start Date End Date Taking? Authorizing Provider   metroNIDAZOLE (FLAGYL) 250 MG tablet Take 1 tablet by mouth 3 times daily    Historical Provider, MD   fenofibrate micronized (LOFIBRA) 134 MG capsule TAKE 1 CAPLET BY MOUTH ONCE DAILY 1/10/23   Historical Provider, MD   Cholecalciferol (VITAMIN D3) 1.25 MG (54661 UT) CAPS Take by mouth    Historical Provider, MD   folic acid (FOLVITE) 1 MG tablet Take 1 tablet by mouth daily    Historical Provider, MD   atorvastatin (LIPITOR) 10 MG tablet Take 1 tablet by mouth daily    Historical Provider, MD   cetirizine (ZYRTEC) 10 MG tablet Take 1 tablet by mouth daily    Historical Provider, MD   Tirzepatide (MOUNJARO) 7.5 MG/0.5ML SOPN SC injection Inject 10 mg into the skin once a week    Historical Provider, MD   montelukast (SINGULAIR) 10 MG tablet Take 1 tablet by mouth nightly    Historical Provider, MD   albuterol (PROVENTIL) (2.5 MG/3ML) 0.083% nebulizer solution Inhale 3 mLs into the lungs every 4 hours as needed 20   Historical Provider, MD       Current medications:    No current facility-administered medications for this encounter. Allergies: Allergies   Allergen Reactions    Percocet [Oxycodone-Acetaminophen] Itching    Sulfa Antibiotics Hives    Sulfasalazine Hives    Ace Inhibitors Other (See Comments), Hives and Rash    Codeine Nausea And Vomiting and Nausea Only    Oxycodone-Acetaminophen Hives, Itching and Nausea And Vomiting       Problem List:    Patient Active Problem List   Diagnosis Code    GERD (gastroesophageal reflux disease) K21.9    Anxiety F41.9    Depression F32. A   

## 2023-05-08 NOTE — DISCHARGE INSTRUCTIONS
forgotten. We want to thank you for choosing the [facility] as your health care provider. We always strive to  provide you with excellent care while you are here. You may receive a survey in the mail regarding your care. We would appreciate you taking a few minutes of your time to complete this survey.   Again, thank you for choosing the [facility]

## 2023-05-08 NOTE — PROGRESS NOTES
Ambulatory Surgery/Procedure Discharge Note    Vitals:    05/08/23 1140   BP: 116/76   Pulse: 74   Resp: 18   Temp:    SpO2: 99%       In: 300 [I.V.:300]  Out: -     Restroom use offered before discharge. Yes    Pain assessment:  none  Pain Level: 0    Pt and friend states \"ready to go home\". Pt alert and oriented x4. IV removed. Denies N/V or pain. Tolerating PO. Discharge instructions given to pt and friend with pt permission. Pt and friend verbalized understanding of all instructions. Left with all belongings and discharge instructions. Patient discharged to home/self care. Patient discharged via wheel chair by transporter to waiting friend.        5/8/2023 11:53 AM

## 2023-05-08 NOTE — ANESTHESIA POSTPROCEDURE EVALUATION
Department of Anesthesiology  Postprocedure Note    Patient: Alea Gale  MRN: [id number]  [place]: 1970  Date of evaluation: 5/8/2023      Procedure Summary     Date: 05/08/23 Room / Location: [address] [place] [address] Ian Dumont 01 / [facility]    Anesthesia Start: 1059 Anesthesia Stop: 1112    Procedure: EGD BIOPSY Diagnosis:       Gastroesophageal reflux disease, unspecified whether esophagitis present      S/P bariatric surgery      (Gastroesophageal reflux disease, unspecified whether esophagitis present [K21.9])      (S/P bariatric surgery [Z98.84])    Surgeons: Dafne Isidro DO Responsible Provider: Polly Dumont MD    Anesthesia Type: MAC ASA Status: 3          Anesthesia Type: No value filed.     Amrita Phase I: Amrita Score: 10    Amrita Phase II: Amrita Score: 10      Anesthesia Post Evaluation    Patient location during evaluation: PACU  Patient participation: complete - patient participated  Level of consciousness: awake  Pain score: 0  Airway patency: patent  Nausea & Vomiting: no nausea  Complications: no  Cardiovascular status: hemodynamically stable  Respiratory status: acceptable  Hydration status: stable

## 2023-05-11 ENCOUNTER — TELEPHONE (OUTPATIENT)
Dept: BARIATRICS/WEIGHT MGMT | Age: 53
End: 2023-05-11

## 2023-05-19 PROBLEM — Z01.810 PREOP CARDIOVASCULAR EXAM: Status: RESOLVED | Noted: 2023-04-19 | Resolved: 2023-05-19

## 2023-05-25 RX ORDER — OMEPRAZOLE 20 MG/1
20 TABLET, DELAYED RELEASE ORAL DAILY
COMMUNITY

## 2023-05-25 RX ORDER — BUSPIRONE HYDROCHLORIDE 5 MG/1
5 TABLET ORAL 3 TIMES DAILY
COMMUNITY
Start: 2023-03-30

## 2023-05-30 ENCOUNTER — TELEPHONE (OUTPATIENT)
Dept: BARIATRICS/WEIGHT MGMT | Age: 53
End: 2023-05-30

## 2023-05-30 NOTE — TELEPHONE ENCOUNTER
Spoke with pt to inquire if pre op pe had been completed. Pt states it was done last week. Will check at the [facility] office during clinic tomorrow to ensure it is received, if not, pt aware we will reach out again. Pt getting labs done on 5/31/23. Pt advised to arrive at 7:30 am for her 10:00 am surgery and reminded to be NPO and follow clear liquid diet the day prior.

## 2023-05-31 ENCOUNTER — HOSPITAL ENCOUNTER (OUTPATIENT)
Age: 53
Discharge: HOME | End: 2023-05-31
Payer: COMMERCIAL

## 2023-05-31 DIAGNOSIS — Z01.818 PREOP TESTING: ICD-10-CM

## 2023-05-31 LAB
DEPRECATED RDW RBC AUTO: 14.6 % (ref 12.4–15.4)
HCT VFR BLD AUTO: 39.7 % (ref 36–48)
HGB BLD-MCNC: 13.2 G/DL (ref 12–16)
MCH RBC QN AUTO: 28.9 PG (ref 26–34)
MCHC RBC AUTO-ENTMCNC: 33.3 G/DL (ref 31–36)
MCV RBC AUTO: 87 FL (ref 80–100)
PLATELET # BLD AUTO: 297 K/UL (ref 135–450)
PMV BLD AUTO: 8 FL (ref 5–10.5)
RBC # BLD AUTO: 4.57 M/UL (ref 4–5.2)
WBC # BLD AUTO: 10.6 K/UL (ref 4–11)

## 2023-05-31 PROCEDURE — 85027 COMPLETE CBC AUTOMATED: CPT

## 2023-05-31 PROCEDURE — 36415 COLL VENOUS BLD VENIPUNCTURE: CPT

## 2023-06-02 ENCOUNTER — HOSPITAL ENCOUNTER (OUTPATIENT)
Age: 53
Setting detail: OUTPATIENT SURGERY
Discharge: HOME | End: 2023-06-02
Attending: SURGERY | Admitting: SURGERY
Payer: COMMERCIAL

## 2023-06-02 ENCOUNTER — ANESTHESIA EVENT (OUTPATIENT)
Dept: OPERATING ROOM | Age: 53
End: 2023-06-02
Payer: COMMERCIAL

## 2023-06-02 ENCOUNTER — ANESTHESIA (OUTPATIENT)
Dept: OPERATING ROOM | Age: 53
End: 2023-06-02
Payer: COMMERCIAL

## 2023-06-02 VITALS
WEIGHT: 215.4 LBS | HEART RATE: 66 BPM | SYSTOLIC BLOOD PRESSURE: 113 MMHG | BODY MASS INDEX: 36.77 KG/M2 | HEIGHT: 64 IN | OXYGEN SATURATION: 97 % | RESPIRATION RATE: 16 BRPM | TEMPERATURE: 97 F | DIASTOLIC BLOOD PRESSURE: 91 MMHG

## 2023-06-02 DIAGNOSIS — R11.2 NAUSEA AND VOMITING, UNSPECIFIED VOMITING TYPE: ICD-10-CM

## 2023-06-02 DIAGNOSIS — K21.9 GASTROESOPHAGEAL REFLUX DISEASE, UNSPECIFIED WHETHER ESOPHAGITIS PRESENT: ICD-10-CM

## 2023-06-02 DIAGNOSIS — Z98.84 S/P BARIATRIC SURGERY: ICD-10-CM

## 2023-06-02 DIAGNOSIS — R10.9 ABDOMINAL PAIN, UNSPECIFIED ABDOMINAL LOCATION: ICD-10-CM

## 2023-06-02 DIAGNOSIS — G89.18 POST-OP PAIN: Primary | ICD-10-CM

## 2023-06-02 LAB
ABO + RH BLD: NORMAL
ALBUMIN SERPL-MCNC: 4.2 G/DL (ref 3.4–5)
ALBUMIN/GLOB SERPL: 1.4 {RATIO} (ref 1.1–2.2)
ALP SERPL-CCNC: 70 U/L (ref 40–129)
ALT SERPL-CCNC: 16 U/L (ref 10–40)
ANION GAP SERPL CALCULATED.3IONS-SCNC: 13 MMOL/L (ref 3–16)
AST SERPL-CCNC: 17 U/L (ref 15–37)
BILIRUB SERPL-MCNC: 0.3 MG/DL (ref 0–1)
BLD GP AB SCN SERPL QL: NORMAL
BUN SERPL-MCNC: 18 MG/DL (ref 7–20)
CALCIUM SERPL-MCNC: 9.7 MG/DL (ref 8.3–10.6)
CHLORIDE SERPL-SCNC: 99 MMOL/L (ref 99–110)
CO2 SERPL-SCNC: 26 MMOL/L (ref 21–32)
CREAT SERPL-MCNC: 0.7 MG/DL (ref 0.6–1.1)
GFR SERPLBLD CREATININE-BSD FMLA CKD-EPI: >60 ML/MIN/{1.73_M2}
GLUCOSE BLD-MCNC: 145 MG/DL (ref 70–99)
GLUCOSE BLD-MCNC: 158 MG/DL (ref 70–99)
GLUCOSE SERPL-MCNC: 154 MG/DL (ref 70–99)
HCG UR QL: NEGATIVE
PERFORMED ON: ABNORMAL
PERFORMED ON: ABNORMAL
POTASSIUM SERPL-SCNC: 4.2 MMOL/L (ref 3.5–5.1)
PROT SERPL-MCNC: 7.1 G/DL (ref 6.4–8.2)
SODIUM SERPL-SCNC: 138 MMOL/L (ref 136–145)

## 2023-06-02 PROCEDURE — 2720000010 HC SURG SUPPLY STERILE: Performed by: SURGERY

## 2023-06-02 PROCEDURE — 2500000003 HC RX 250 WO HCPCS: Performed by: SURGERY

## 2023-06-02 PROCEDURE — 2580000003 HC RX 258: Performed by: ANESTHESIOLOGY

## 2023-06-02 PROCEDURE — 88300 SURGICAL PATH GROSS: CPT

## 2023-06-02 PROCEDURE — 7100000011 HC PHASE II RECOVERY - ADDTL 15 MIN: Performed by: SURGERY

## 2023-06-02 PROCEDURE — 2580000003 HC RX 258: Performed by: SURGERY

## 2023-06-02 PROCEDURE — 6360000002 HC RX W HCPCS: Performed by: SURGERY

## 2023-06-02 PROCEDURE — 86850 RBC ANTIBODY SCREEN: CPT

## 2023-06-02 PROCEDURE — 3600000014 HC SURGERY LEVEL 4 ADDTL 15MIN: Performed by: SURGERY

## 2023-06-02 PROCEDURE — 7100000010 HC PHASE II RECOVERY - FIRST 15 MIN: Performed by: SURGERY

## 2023-06-02 PROCEDURE — 86900 BLOOD TYPING SEROLOGIC ABO: CPT

## 2023-06-02 PROCEDURE — 80053 COMPREHEN METABOLIC PANEL: CPT

## 2023-06-02 PROCEDURE — 2500000003 HC RX 250 WO HCPCS: Performed by: ANESTHESIOLOGY

## 2023-06-02 PROCEDURE — 6360000002 HC RX W HCPCS: Performed by: ANESTHESIOLOGY

## 2023-06-02 PROCEDURE — 7100000001 HC PACU RECOVERY - ADDTL 15 MIN: Performed by: SURGERY

## 2023-06-02 PROCEDURE — 43774 LAP RMVL GASTR ADJ ALL PARTS: CPT | Performed by: SURGERY

## 2023-06-02 PROCEDURE — 3700000000 HC ANESTHESIA ATTENDED CARE: Performed by: SURGERY

## 2023-06-02 PROCEDURE — 2709999900 HC NON-CHARGEABLE SUPPLY: Performed by: SURGERY

## 2023-06-02 PROCEDURE — 3700000001 HC ADD 15 MINUTES (ANESTHESIA): Performed by: SURGERY

## 2023-06-02 PROCEDURE — 6370000000 HC RX 637 (ALT 250 FOR IP): Performed by: SURGERY

## 2023-06-02 PROCEDURE — 86901 BLOOD TYPING SEROLOGIC RH(D): CPT

## 2023-06-02 PROCEDURE — 3600000004 HC SURGERY LEVEL 4 BASE: Performed by: SURGERY

## 2023-06-02 PROCEDURE — 7100000000 HC PACU RECOVERY - FIRST 15 MIN: Performed by: SURGERY

## 2023-06-02 PROCEDURE — 84703 CHORIONIC GONADOTROPIN ASSAY: CPT

## 2023-06-02 RX ORDER — PROCHLORPERAZINE EDISYLATE 5 MG/ML
5 INJECTION INTRAMUSCULAR; INTRAVENOUS
Status: COMPLETED | OUTPATIENT
Start: 2023-06-02 | End: 2023-06-02

## 2023-06-02 RX ORDER — SCOLOPAMINE TRANSDERMAL SYSTEM 1 MG/1
1 PATCH, EXTENDED RELEASE TRANSDERMAL ONCE
Status: DISCONTINUED | OUTPATIENT
Start: 2023-06-02 | End: 2023-06-02 | Stop reason: HOSPADM

## 2023-06-02 RX ORDER — CALCIUM CHLORIDE 100 MG/ML
INJECTION INTRAVENOUS; INTRAVENTRICULAR PRN
Status: DISCONTINUED | OUTPATIENT
Start: 2023-06-02 | End: 2023-06-02 | Stop reason: SDUPTHER

## 2023-06-02 RX ORDER — FENTANYL CITRATE 50 UG/ML
INJECTION, SOLUTION INTRAMUSCULAR; INTRAVENOUS PRN
Status: DISCONTINUED | OUTPATIENT
Start: 2023-06-02 | End: 2023-06-02 | Stop reason: SDUPTHER

## 2023-06-02 RX ORDER — SODIUM CHLORIDE 0.9 % (FLUSH) 0.9 %
5-40 SYRINGE (ML) INJECTION PRN
Status: DISCONTINUED | OUTPATIENT
Start: 2023-06-02 | End: 2023-06-02 | Stop reason: HOSPADM

## 2023-06-02 RX ORDER — LIDOCAINE HYDROCHLORIDE 20 MG/ML
INJECTION, SOLUTION INTRAVENOUS PRN
Status: DISCONTINUED | OUTPATIENT
Start: 2023-06-02 | End: 2023-06-02 | Stop reason: SDUPTHER

## 2023-06-02 RX ORDER — ACETAMINOPHEN 160 MG/5ML
650 SOLUTION ORAL ONCE
Status: COMPLETED | OUTPATIENT
Start: 2023-06-02 | End: 2023-06-02

## 2023-06-02 RX ORDER — ERGOCALCIFEROL 1.25 MG/1
50000 CAPSULE ORAL WEEKLY
COMMUNITY
Start: 2023-05-20

## 2023-06-02 RX ORDER — SODIUM CHLORIDE 0.9 % (FLUSH) 0.9 %
5-40 SYRINGE (ML) INJECTION EVERY 12 HOURS SCHEDULED
Status: DISCONTINUED | OUTPATIENT
Start: 2023-06-02 | End: 2023-06-02 | Stop reason: HOSPADM

## 2023-06-02 RX ORDER — METHOCARBAMOL 500 MG/1
500 TABLET, FILM COATED ORAL EVERY 8 HOURS PRN
Qty: 21 TABLET | Refills: 0 | Status: SHIPPED | OUTPATIENT
Start: 2023-06-02 | End: 2023-06-09

## 2023-06-02 RX ORDER — SODIUM CHLORIDE, SODIUM LACTATE, POTASSIUM CHLORIDE, CALCIUM CHLORIDE 600; 310; 30; 20 MG/100ML; MG/100ML; MG/100ML; MG/100ML
INJECTION, SOLUTION INTRAVENOUS CONTINUOUS
Status: DISCONTINUED | OUTPATIENT
Start: 2023-06-02 | End: 2023-06-02 | Stop reason: HOSPADM

## 2023-06-02 RX ORDER — PREGABALIN 150 MG/1
150 CAPSULE ORAL ONCE
Status: COMPLETED | OUTPATIENT
Start: 2023-06-02 | End: 2023-06-02

## 2023-06-02 RX ORDER — ROCURONIUM BROMIDE 10 MG/ML
INJECTION, SOLUTION INTRAVENOUS PRN
Status: DISCONTINUED | OUTPATIENT
Start: 2023-06-02 | End: 2023-06-02 | Stop reason: SDUPTHER

## 2023-06-02 RX ORDER — LABETALOL HYDROCHLORIDE 5 MG/ML
10 INJECTION, SOLUTION INTRAVENOUS
Status: DISCONTINUED | OUTPATIENT
Start: 2023-06-02 | End: 2023-06-02 | Stop reason: HOSPADM

## 2023-06-02 RX ORDER — DOCUSATE SODIUM 100 MG/1
100 CAPSULE, LIQUID FILLED ORAL 2 TIMES DAILY PRN
Qty: 28 CAPSULE | Refills: 0 | Status: SHIPPED | OUTPATIENT
Start: 2023-06-02 | End: 2023-06-16

## 2023-06-02 RX ORDER — SODIUM CHLORIDE 9 MG/ML
INJECTION, SOLUTION INTRAVENOUS PRN
Status: DISCONTINUED | OUTPATIENT
Start: 2023-06-02 | End: 2023-06-02 | Stop reason: HOSPADM

## 2023-06-02 RX ORDER — APREPITANT 40 MG/1
80 CAPSULE ORAL ONCE
Status: COMPLETED | OUTPATIENT
Start: 2023-06-02 | End: 2023-06-02

## 2023-06-02 RX ORDER — ENOXAPARIN SODIUM 100 MG/ML
30 INJECTION SUBCUTANEOUS ONCE
Status: COMPLETED | OUTPATIENT
Start: 2023-06-02 | End: 2023-06-02

## 2023-06-02 RX ORDER — MIDAZOLAM HYDROCHLORIDE 1 MG/ML
INJECTION INTRAMUSCULAR; INTRAVENOUS PRN
Status: DISCONTINUED | OUTPATIENT
Start: 2023-06-02 | End: 2023-06-02 | Stop reason: SDUPTHER

## 2023-06-02 RX ORDER — HYDROMORPHONE HYDROCHLORIDE 1 MG/ML
0.25 INJECTION, SOLUTION INTRAMUSCULAR; INTRAVENOUS; SUBCUTANEOUS EVERY 5 MIN PRN
Status: DISCONTINUED | OUTPATIENT
Start: 2023-06-02 | End: 2023-06-02 | Stop reason: HOSPADM

## 2023-06-02 RX ORDER — ONDANSETRON 2 MG/ML
INJECTION INTRAMUSCULAR; INTRAVENOUS PRN
Status: DISCONTINUED | OUTPATIENT
Start: 2023-06-02 | End: 2023-06-02 | Stop reason: SDUPTHER

## 2023-06-02 RX ORDER — HYDROMORPHONE HYDROCHLORIDE 1 MG/ML
0.5 INJECTION, SOLUTION INTRAMUSCULAR; INTRAVENOUS; SUBCUTANEOUS EVERY 5 MIN PRN
Status: COMPLETED | OUTPATIENT
Start: 2023-06-02 | End: 2023-06-02

## 2023-06-02 RX ORDER — LIDOCAINE HYDROCHLORIDE 10 MG/ML
1 INJECTION, SOLUTION EPIDURAL; INFILTRATION; INTRACAUDAL; PERINEURAL
Status: DISCONTINUED | OUTPATIENT
Start: 2023-06-02 | End: 2023-06-02 | Stop reason: HOSPADM

## 2023-06-02 RX ORDER — KETOROLAC TROMETHAMINE 30 MG/ML
INJECTION, SOLUTION INTRAMUSCULAR; INTRAVENOUS PRN
Status: DISCONTINUED | OUTPATIENT
Start: 2023-06-02 | End: 2023-06-02 | Stop reason: SDUPTHER

## 2023-06-02 RX ORDER — SODIUM CHLORIDE, SODIUM LACTATE, POTASSIUM CHLORIDE, AND CALCIUM CHLORIDE .6; .31; .03; .02 G/100ML; G/100ML; G/100ML; G/100ML
IRRIGANT IRRIGATION PRN
Status: DISCONTINUED | OUTPATIENT
Start: 2023-06-02 | End: 2023-06-02 | Stop reason: HOSPADM

## 2023-06-02 RX ORDER — PROPOFOL 10 MG/ML
INJECTION, EMULSION INTRAVENOUS PRN
Status: DISCONTINUED | OUTPATIENT
Start: 2023-06-02 | End: 2023-06-02 | Stop reason: SDUPTHER

## 2023-06-02 RX ORDER — DEXAMETHASONE SODIUM PHOSPHATE 4 MG/ML
INJECTION, SOLUTION INTRA-ARTICULAR; INTRALESIONAL; INTRAMUSCULAR; INTRAVENOUS; SOFT TISSUE PRN
Status: DISCONTINUED | OUTPATIENT
Start: 2023-06-02 | End: 2023-06-02 | Stop reason: SDUPTHER

## 2023-06-02 RX ORDER — OXYCODONE HYDROCHLORIDE 5 MG/1
5 TABLET ORAL EVERY 6 HOURS PRN
Qty: 28 TABLET | Refills: 0 | Status: SHIPPED | OUTPATIENT
Start: 2023-06-02 | End: 2023-06-09

## 2023-06-02 RX ORDER — HYDRALAZINE HYDROCHLORIDE 20 MG/ML
10 INJECTION INTRAMUSCULAR; INTRAVENOUS
Status: DISCONTINUED | OUTPATIENT
Start: 2023-06-02 | End: 2023-06-02 | Stop reason: HOSPADM

## 2023-06-02 RX ORDER — PHENYLEPHRINE HYDROCHLORIDE 10 MG/ML
INJECTION INTRAVENOUS PRN
Status: DISCONTINUED | OUTPATIENT
Start: 2023-06-02 | End: 2023-06-02 | Stop reason: SDUPTHER

## 2023-06-02 RX ORDER — MAGNESIUM HYDROXIDE 1200 MG/15ML
LIQUID ORAL PRN
Status: DISCONTINUED | OUTPATIENT
Start: 2023-06-02 | End: 2023-06-02 | Stop reason: HOSPADM

## 2023-06-02 RX ORDER — ONDANSETRON 2 MG/ML
4 INJECTION INTRAMUSCULAR; INTRAVENOUS
Status: DISCONTINUED | OUTPATIENT
Start: 2023-06-02 | End: 2023-06-02 | Stop reason: HOSPADM

## 2023-06-02 RX ORDER — BUPIVACAINE HYDROCHLORIDE 5 MG/ML
INJECTION, SOLUTION EPIDURAL; INTRACAUDAL PRN
Status: DISCONTINUED | OUTPATIENT
Start: 2023-06-02 | End: 2023-06-02 | Stop reason: HOSPADM

## 2023-06-02 RX ADMIN — MIDAZOLAM HYDROCHLORIDE 2 MG: 1 INJECTION INTRAMUSCULAR; INTRAVENOUS at 10:19

## 2023-06-02 RX ADMIN — ACETAMINOPHEN 650 MG: 650 SOLUTION ORAL at 08:23

## 2023-06-02 RX ADMIN — PROPOFOL 200 MG: 10 INJECTION, EMULSION INTRAVENOUS at 10:27

## 2023-06-02 RX ADMIN — KETOROLAC TROMETHAMINE 30 MG: 30 INJECTION, SOLUTION INTRAMUSCULAR; INTRAVENOUS at 12:18

## 2023-06-02 RX ADMIN — PHENYLEPHRINE HYDROCHLORIDE 100 MCG: 10 INJECTION INTRAVENOUS at 11:10

## 2023-06-02 RX ADMIN — PHENYLEPHRINE HYDROCHLORIDE 100 MCG: 10 INJECTION INTRAVENOUS at 11:00

## 2023-06-02 RX ADMIN — SODIUM CHLORIDE, POTASSIUM CHLORIDE, SODIUM LACTATE AND CALCIUM CHLORIDE: 600; 310; 30; 20 INJECTION, SOLUTION INTRAVENOUS at 09:15

## 2023-06-02 RX ADMIN — SODIUM CHLORIDE, POTASSIUM CHLORIDE, SODIUM LACTATE AND CALCIUM CHLORIDE: 600; 310; 30; 20 INJECTION, SOLUTION INTRAVENOUS at 08:50

## 2023-06-02 RX ADMIN — ROCURONIUM BROMIDE 100 MG: 10 INJECTION, SOLUTION INTRAVENOUS at 10:27

## 2023-06-02 RX ADMIN — HYDROMORPHONE HYDROCHLORIDE 0.5 MG: 1 INJECTION, SOLUTION INTRAMUSCULAR; INTRAVENOUS; SUBCUTANEOUS at 13:30

## 2023-06-02 RX ADMIN — ENOXAPARIN SODIUM 30 MG: 100 INJECTION SUBCUTANEOUS at 08:50

## 2023-06-02 RX ADMIN — PROCHLORPERAZINE EDISYLATE 5 MG: 5 INJECTION, SOLUTION INTRAMUSCULAR; INTRAVENOUS at 15:22

## 2023-06-02 RX ADMIN — CALCIUM CHLORIDE 0.25 G: 100 INJECTION INTRAVENOUS; INTRAVENTRICULAR at 11:24

## 2023-06-02 RX ADMIN — PREGABALIN 150 MG: 150 CAPSULE ORAL at 08:18

## 2023-06-02 RX ADMIN — CEFAZOLIN 2000 MG: 2 INJECTION, POWDER, FOR SOLUTION INTRAMUSCULAR; INTRAVENOUS at 10:30

## 2023-06-02 RX ADMIN — ONDANSETRON 8 MG: 2 INJECTION INTRAMUSCULAR; INTRAVENOUS at 11:51

## 2023-06-02 RX ADMIN — DEXAMETHASONE SODIUM PHOSPHATE 4 MG: 4 INJECTION, SOLUTION INTRA-ARTICULAR; INTRALESIONAL; INTRAMUSCULAR; INTRAVENOUS; SOFT TISSUE at 11:51

## 2023-06-02 RX ADMIN — CALCIUM CHLORIDE 0.25 G: 100 INJECTION INTRAVENOUS; INTRAVENTRICULAR at 11:13

## 2023-06-02 RX ADMIN — APREPITANT 80 MG: 40 CAPSULE ORAL at 08:18

## 2023-06-02 RX ADMIN — SODIUM CHLORIDE, POTASSIUM CHLORIDE, SODIUM LACTATE AND CALCIUM CHLORIDE: 600; 310; 30; 20 INJECTION, SOLUTION INTRAVENOUS at 12:01

## 2023-06-02 RX ADMIN — HYDROMORPHONE HYDROCHLORIDE 0.5 MG: 1 INJECTION, SOLUTION INTRAMUSCULAR; INTRAVENOUS; SUBCUTANEOUS at 14:00

## 2023-06-02 RX ADMIN — PHENYLEPHRINE HYDROCHLORIDE 100 MCG: 10 INJECTION INTRAVENOUS at 11:24

## 2023-06-02 RX ADMIN — PHENYLEPHRINE HYDROCHLORIDE 100 MCG: 10 INJECTION INTRAVENOUS at 11:11

## 2023-06-02 RX ADMIN — HYDROMORPHONE HYDROCHLORIDE 0.5 MG: 1 INJECTION, SOLUTION INTRAMUSCULAR; INTRAVENOUS; SUBCUTANEOUS at 13:16

## 2023-06-02 RX ADMIN — LIDOCAINE HYDROCHLORIDE 100 MG: 20 INJECTION, SOLUTION INTRAVENOUS at 10:27

## 2023-06-02 RX ADMIN — HYDROMORPHONE HYDROCHLORIDE 0.5 MG: 1 INJECTION, SOLUTION INTRAMUSCULAR; INTRAVENOUS; SUBCUTANEOUS at 12:44

## 2023-06-02 RX ADMIN — CALCIUM CHLORIDE 0.25 G: 100 INJECTION INTRAVENOUS; INTRAVENTRICULAR at 11:17

## 2023-06-02 RX ADMIN — FENTANYL CITRATE 100 MCG: 50 INJECTION, SOLUTION INTRAMUSCULAR; INTRAVENOUS at 10:50

## 2023-06-02 ASSESSMENT — PAIN DESCRIPTION - ORIENTATION
ORIENTATION: MID
ORIENTATION: MID
ORIENTATION: OTHER (COMMENT)
ORIENTATION: MID

## 2023-06-02 ASSESSMENT — PAIN DESCRIPTION - LOCATION
LOCATION: ABDOMEN

## 2023-06-02 ASSESSMENT — PAIN DESCRIPTION - PAIN TYPE
TYPE: SURGICAL PAIN
TYPE: ACUTE PAIN;SURGICAL PAIN

## 2023-06-02 ASSESSMENT — PAIN SCALES - GENERAL
PAINLEVEL_OUTOF10: 4
PAINLEVEL_OUTOF10: 7
PAINLEVEL_OUTOF10: 7
PAINLEVEL_OUTOF10: 4
PAINLEVEL_OUTOF10: 7
PAINLEVEL_OUTOF10: 8

## 2023-06-02 ASSESSMENT — PAIN DESCRIPTION - FREQUENCY
FREQUENCY: CONTINUOUS

## 2023-06-02 ASSESSMENT — PAIN DESCRIPTION - DESCRIPTORS
DESCRIPTORS: BURNING
DESCRIPTORS: BURNING
DESCRIPTORS: OTHER (COMMENT)
DESCRIPTORS: BURNING

## 2023-06-02 ASSESSMENT — PAIN - FUNCTIONAL ASSESSMENT
PAIN_FUNCTIONAL_ASSESSMENT: ACTIVITIES ARE NOT PREVENTED
PAIN_FUNCTIONAL_ASSESSMENT: 0-10

## 2023-06-02 ASSESSMENT — LIFESTYLE VARIABLES: SMOKING_STATUS: 0

## 2023-06-02 ASSESSMENT — PAIN DESCRIPTION - ONSET
ONSET: ON-GOING

## 2023-06-02 NOTE — PROGRESS NOTES
PACU Transfer to [facility]    Vitals:    06/02/23 1500   BP: (!) 119/53   Pulse: 57   Resp: 13   Temp: 97.1 °F (36.2 °C)   SpO2: 94%         Intake/Output Summary (Last 24 hours) at 6/2/2023 1513  Last data filed at 6/2/2023 1512  Gross per 24 hour   Intake 2360 ml   Output 25 ml   Net 2335 ml       Pain assessment:  none  Pain Level: 4    Patient transferred to care of [facility] RN. Dr. Cecilia Velázquez made aware of O2 saturation when sleeping and that both the patient and pts mother, Sarah Luis both verbalized to writer pt will wear CPAP at home.      6/2/2023 3:13 PM

## 2023-06-02 NOTE — FLOWSHEET NOTE
Pt positive for JUSTINA. Pt c/o pain and was given PRN pain medication. Pt now drowsy and pulse ox decreases to 80s. Pt placed on 2 L NC. Will continue to monitor.

## 2023-06-02 NOTE — ANESTHESIA PRE PROCEDURE
87.0 05/31/2023 10:28 AM    RDW 14.6 05/31/2023 10:28 AM     05/31/2023 10:28 AM       CMP:   Lab Results   Component Value Date/Time     09/01/2021 11:38 AM    K 4.8 09/01/2021 11:38 AM     09/01/2021 11:38 AM    CO2 23 09/01/2021 11:38 AM    BUN 9 04/25/2023 09:47 AM    CREATININE 0.7 04/25/2023 09:47 AM    GFRAA >60 09/01/2021 11:38 AM    GFRAA >60 05/04/2010 10:35 AM    AGRATIO 1.3 09/01/2021 11:38 AM    LABGLOM >60 04/25/2023 09:47 AM    GLUCOSE 102 09/01/2021 11:38 AM    PROT 7.2 09/01/2021 11:38 AM    PROT 7.2 05/04/2010 10:35 AM    CALCIUM 9.0 09/01/2021 11:38 AM    BILITOT 0.4 09/01/2021 11:38 AM    ALKPHOS 91 09/01/2021 11:38 AM    AST 77 09/01/2021 11:38 AM    ALT 66 09/01/2021 11:38 AM       POC Tests: No results for input(s): POCGLU, POCNA, POCK, POCCL, POCBUN, POCHEMO, POCHCT in the last 72 hours. Coags: No results found for: PROTIME, INR, APTT    HCG (If Applicable):   Lab Results   Component Value Date    PREGTESTUR Negative 05/08/2023        ABGs: No results found for: PHART, PO2ART, YBJ0EER, OSZ1HEN, BEART, W6ZXOPWK     Type & Screen (If Applicable):  No results found for: LABABO, LABRH    Drug/Infectious Status (If Applicable):  No results found for: HIV, HEPCAB    COVID-19 Screening (If Applicable):   Lab Results   Component Value Date/Time    COVID19 NOT DETECTED 08/06/2020 12:23 PM           Anesthesia Evaluation  Patient summary reviewed and Nursing notes reviewed   history of anesthetic complications: PONV.   Airway: Mallampati: III  TM distance: >3 FB   Neck ROM: full  Mouth opening: > = 3 FB   Dental: normal exam         Pulmonary: breath sounds clear to auscultation  (+) sleep apnea: on noncompliant,  asthma:     (-) not a current smoker                           Cardiovascular:  Exercise tolerance: good (>4 METS),   (+) hypertension:, hyperlipidemia        Rhythm: regular  Rate: normal                    Neuro/Psych:   (+) depression/anxiety

## 2023-06-02 NOTE — FLOWSHEET NOTE
Pt c/o its hot and wants to leave. Pt states she has a sleep study that she needs to schedule. Pt has a CPAP at home, informed pt she needs to wear. Pt mother, Krish Stafford called and made aware that pt needs to wear CPAP.

## 2023-06-02 NOTE — PROGRESS NOTES
Pt to [facility] for robotic band removal.  Pt is alert; oriented X 4; speech clear; breathing easily on RA; denies any pain; walks with steady gait without assist.   Urine Hcg is 'negative.'  After warming pt's arms, placed #18 IV in right wrist/hand area and T&S x 2 and CMP drawn and sent to lab. Glc is 158. Placed second IV #20 in left hand after two sticks. Pt tolerated all well. Medicated pt with preop meds as ordered;  scopolamine patch, tylenol, lyrica, emend 80 mg, and lovenox 30 mg SQ. Dr. ALINE MACIEL and Dr. Lillian Hdz, surg resident, have been to bedside and are aware that copy of H&P was brought in by pt and is in paper chart. Ancef 2 g IVPB will go to OR with pt. Mother at bedside. Call light within reach.

## 2023-06-02 NOTE — PROGRESS NOTES
Patient assisted to the bathroom. She was able to void without difficulty. She was able to get dressed but needed to lay back down. She stated she felt hot and nauseous. This nurse assisted the patient back to bed.

## 2023-06-02 NOTE — ANESTHESIA POSTPROCEDURE EVALUATION
Department of Anesthesiology  Postprocedure Note    Patient: Mahad Appiah  MRN: [id number]  YOB: 1970  Date of evaluation: 6/2/2023      Procedure Summary     Date: 06/02/23 Room / Location: [address] [place] [address]    Anesthesia Start: 1020 Anesthesia Stop: 1368    Procedure: ROBOTIC BAND REMOVAL, TAKE DOWN OF GASTRIC ADHESIONS, LAPAROSCOPIC LYSIS OF ADHESIONS (Abdomen) Diagnosis:       Nausea and vomiting, unspecified vomiting type      S/P bariatric surgery      Gastroesophageal reflux disease, unspecified whether esophagitis present      Abdominal pain, unspecified abdominal location      (Nausea and vomiting, unspecified vomiting type [R11.2])      (S/P bariatric surgery [Z98.84]  Gastroesophageal reflux disease, unspecified whether esophagitis present [K21.9]  Abdominal pain, unspecified abdominal location [R10.9])    Surgeons: Fanny Graham DO Responsible Provider: Suzy Dumont DO    Anesthesia Type: general ASA Status: 3          Anesthesia Type: No value filed.     Amrita Phase I: Amrita Score: 7    Amrita Phase II:        Anesthesia Post Evaluation    Patient location during evaluation: PACU  Level of consciousness: awake  Complications: no  Multimodal analgesia pain management approach

## 2023-06-02 NOTE — H&P
Update History & Physical    The patient's History and Physical of May 10, 2023 was reviewed with the patient and I examined the patient. There was no change. The surgical site was confirmed by the patient and Dr. Fern Jain. Patient also has an updated H&P from 5/24 in her physical chart. Patient reports no changes to her H&P since last being seen in office. Plan: The risks, benefits, expected outcome, and alternative to the recommended procedure have been discussed with the patient. Patient understands and wants to proceed with the procedure.      Electronically signed by Adams Nation DO on 6/2/2023 at 8:44 AM

## 2023-06-02 NOTE — BRIEF OP NOTE
Brief Postoperative Note      Patient: Darrick Saab  YOB: 1970  MRN: [id number]    Date of Procedure: 6/2/2023    Pre-Op Diagnosis Codes:     * Nausea and vomiting, unspecified vomiting type [R11.2]     * S/P bariatric surgery [Z98.84]     * Gastroesophageal reflux disease, unspecified whether esophagitis present [K21.9]     * Abdominal pain, unspecified abdominal location [R10.9]    Post-Op Diagnosis: Same       Procedure(s):  ROBOTIC BAND REMOVAL, TAKE DOWN OF GASTRIC ADHESIONS, LAPAROSCOPIC LYSIS OF ADHESIONS    Surgeon(s): Jenene Opitz, DO    Assistant:  Surgical Assistant: Tanis Soulier  Resident: Nishant Freed DO    Anesthesia: General    Estimated Blood Loss (mL): Minimal    Complications: None    Specimens:   ID Type Source Tests Collected by Time Destination   A : BAND, TUBING AND PORT Hardware Hardware SURGICAL PATHOLOGY Jenene Opitz, DO 6/2/2023 1101        Implants:  * No implants in log *      Drains: * No LDAs found *    Findings: Gastric band removal and subcutaneous port removal, all parts of the band matched and intact, confirmed band was removed in its entirety.        Electronically signed by Nishant Freed DO on 6/2/2023 at 12:31 PM

## 2023-06-02 NOTE — PROGRESS NOTES
2nd attempt.  Non detailed message left for pt to return call to clinic and ask to speak with a triage nurse.    Obdulia ONTIVEROS RN  EP Triage         Dr. Jennifer Ray paged. Patient feeling nauseous. He stated it was ok to give Compazine 5mg (PACU order) for nausea.

## 2023-06-02 NOTE — PROGRESS NOTES
Ambulatory Surgery/Procedure Discharge Note    Vitals:    06/02/23 1515   BP: (!) 113/91   Pulse: 66   Resp: 16   Temp: 97 °F (36.1 °C)   SpO2: 97%   Discharge criteria met per Amrita score. Patient to wear cpap when she gets home tonight. Nausea much improved after resting. Patient did not vomit while in Phase 2. Her nausea came and went with movement. Abdomen soft. She states she was ready to go home. In: 2360 [P.O.:60; I.V.:2250]  Out: 25     Restroom use offered before discharge. Yes    Pain assessment:  none  Pain Level: 4    Patient discharged to home/self care. Patient discharged via wheel chair by transporter to waiting family/S.O.       6/2/2023 4:26 PM Pt and S.O./family states \"ready to go home\". Pt alert and oriented x4. IV removed. N/V controlled and pain controlled. X7 incisions with glue and abdominal binder. Voided prior to discharge. Discharge instructions given to pt and mom with pt permission. Pt and mom verbalized understanding of all instructions. Left with all belongings, 3 prescriptions, and discharge instructions.

## 2023-06-02 NOTE — DISCHARGE INSTRUCTIONS
Diet:   May resume regular diet    Wound Care:   Surgical grade glue was used on your incision, this will aid in the healing of your incision. It will peel off on its own within 10 days. If it does not peel off in 10 days, you may use petroleum jelly to gently remove it. Do not soak or scrub area of incision for 7-10 days. An abdominal binder was placed around your abdomen after surgery. Please keep this on as much as possible for additional support. Please especially wear when you are active (walking, working, driving. ..etc), you may remove while showering. Activity:   No heavy lifting greater than a milk jug, or 8 lbs until follow up. Pain management: For moderate to severe pain please take the Roxicodone that you were prescribed. If you take narcotics, note that they may cause constipation. For constipation take Colace up to two times a day as needed. If stools become loose, you may reduce the amount of colace you are taking or stop taking all together. Take as little narcotic pain medication as you can tolerate. No driving or operating heavy equipment while on narcotics. For mild to moderate pain you may take over-the-counter Tylenol or Motrin as directed on the packaging. We have provided you with a muscle relaxer called Robaxin, please take this as need and prescribed for muscle spasms and pain. No driving or operating heavy equipment while on this medication. Return if:   Call/ Return to ED for increased redness, worsening pain, drainage from wound, or fevers greater than 101.5 F. Follow up with Dr. Francis Aguilar in 2 week(s). Please call the office to make an appointment. Karli INSTRUCTIONS    There are potential side effects of anesthesia or sedation you may experience for the first 24 hours.   These side effects include:    Confusion or Memory loss, Dizziness, or Delayed Reaction Times   [x]A responsible person should be with you

## 2023-06-02 NOTE — PROGRESS NOTES
1226 Admitted to PACU from OR. Connected to monitor. Report at bedside. Moaning occasionally but not awake and SpO2 drops. No sign of nausea.

## 2023-06-05 ENCOUNTER — TELEPHONE (OUTPATIENT)
Dept: BARIATRICS/WEIGHT MGMT | Age: 53
End: 2023-06-05

## 2023-06-05 NOTE — TELEPHONE ENCOUNTER
Surgery Type: Band removal, ALMA DELIA    Surgery Date: 6/2/23    Surgeon: Meghan Black    The patient was contacted to follow up on their recent bariatric surgery. The following topics were reviewed:    [x] Hydration is Adequate            [x]Patient is getting at least 48-64 oz of fluids a day, not including protein shakes. Sf jello/SF popsicles             [x] Food intake is appropriate   Premade protein shake, cream of wheat, scrambled eggs, egg salad with lite goncalves pureed. Pt is trying to eat every 2 hours    [] Following the 30-30-30 rule. NA      [x] Pain relief techniques utilized - Pt reports a lot of pain the last 2 days but reports feeling better today. [x] Taking pain medication as prescribed as directed   [] Utilizing Lidoderm patches (if prescribed)  []Taking Tylenol instead of prescription pain medication   discussed tylenol dosaging (cut up/crushed/liquid/powder), 650mg 1-2 times per day, separate from prescription dosage. When weaned off prescription, may use tylenol up to 650mg up to 6 times per day / 4,000mg in a 24 hour period  [x] Wearing abdominal binder  [x] Using ice for incisional pain - rotating every 15 minutes      [x] Activity is appropriate  [x] Walking 10 minutes out of every hour  [x] Avoiding heavy lifting (>10lbs)  [] Utilizing their incentive spirometer -   not using since surgery - pt reports was given one to use prior to surgery, but was not advised to use after surgery. Pt reports she has been checking her O2 saturation - running between 88-90 (usually 95-97). She started using her old CPAP last night. Whenever she lays down flat on her back she starts coughing, feels like \"there is fluid\". She reports she has been propping herself up when sleeping. Encouraged pt to stay elevated when resting.          [] Issues with Nausea/Vomiting/Reflux - no  [] Using Zofran PRN for nausea/vomiting   [x]Took Prilosec for reflux  on Saturday, along with other medications (Folic Acid, Singulair,

## 2023-06-14 ENCOUNTER — OFFICE VISIT (OUTPATIENT)
Dept: BARIATRICS/WEIGHT MGMT | Age: 53
End: 2023-06-14

## 2023-06-14 VITALS — WEIGHT: 216 LBS | HEIGHT: 64 IN | BODY MASS INDEX: 36.88 KG/M2

## 2023-06-14 DIAGNOSIS — E66.01 SEVERE OBESITY (BMI 35.0-35.9 WITH COMORBIDITY) (HCC): Primary | ICD-10-CM

## 2023-06-14 DIAGNOSIS — E78.2 MIXED HYPERLIPIDEMIA: ICD-10-CM

## 2023-06-14 DIAGNOSIS — E66.9 DIABETES MELLITUS TYPE 2 IN OBESE (HCC): ICD-10-CM

## 2023-06-14 DIAGNOSIS — E11.69 DIABETES MELLITUS TYPE 2 IN OBESE (HCC): ICD-10-CM

## 2023-07-26 ENCOUNTER — TELEPHONE (OUTPATIENT)
Dept: BARIATRICS/WEIGHT MGMT | Age: 53
End: 2023-07-26

## 2023-12-02 ENCOUNTER — HOSPITAL ENCOUNTER (OUTPATIENT)
Dept: WOMENS IMAGING | Age: 53
Discharge: HOME | End: 2023-12-02
Payer: COMMERCIAL

## 2023-12-02 VITALS — HEIGHT: 64 IN | WEIGHT: 225 LBS | BODY MASS INDEX: 38.41 KG/M2

## 2023-12-02 DIAGNOSIS — Z12.31 SCREENING MAMMOGRAM FOR BREAST CANCER: ICD-10-CM

## 2023-12-02 PROCEDURE — 77063 BREAST TOMOSYNTHESIS BI: CPT

## 2024-02-28 ENCOUNTER — HOSPITAL ENCOUNTER (OUTPATIENT)
Dept: ULTRASOUND IMAGING | Age: 54
Discharge: HOME | End: 2024-02-28
Attending: INTERNAL MEDICINE
Payer: COMMERCIAL

## 2024-02-28 DIAGNOSIS — R10.11 ABDOMINAL PAIN, RIGHT UPPER QUADRANT: ICD-10-CM

## 2024-02-28 PROCEDURE — 76705 ECHO EXAM OF ABDOMEN: CPT

## 2024-04-18 ENCOUNTER — OFFICE VISIT (OUTPATIENT)
Dept: SURGERY | Age: 54
End: 2024-04-18

## 2024-04-18 VITALS
BODY MASS INDEX: 36.33 KG/M2 | WEIGHT: 212.8 LBS | DIASTOLIC BLOOD PRESSURE: 70 MMHG | HEIGHT: 64 IN | SYSTOLIC BLOOD PRESSURE: 116 MMHG

## 2024-04-18 DIAGNOSIS — K43.9 VENTRAL HERNIA WITHOUT OBSTRUCTION OR GANGRENE: ICD-10-CM

## 2024-04-18 DIAGNOSIS — R10.11 RUQ PAIN: Primary | ICD-10-CM

## 2024-04-18 ASSESSMENT — ENCOUNTER SYMPTOMS
CHEST TIGHTNESS: 0
ABDOMINAL PAIN: 1
APNEA: 0
DIARRHEA: 1
BACK PAIN: 0
EYE ITCHING: 0
COLOR CHANGE: 0
EYE DISCHARGE: 0
CONSTIPATION: 1

## 2024-04-18 NOTE — PROGRESS NOTES
:     Gloria Blancas is a 54 y.o. female     CC: Right upper quadrant abdominal pain    HPI: 54-year-old female with a several month history of right upper quadrant abdominal pain.  She has a history of a laparoscopic gastric band was placed in .  The band was removed in 2023.  Pain is in the area of the previous port from the gastric band.  There is a bulge in the area which is tender with physical exertion.  No history of nausea or vomiting.  Her only other symptoms are diarrhea/constipation.      Past Medical History:   Diagnosis Date    Anxiety     Asthma     Dental crown present     Depression     Diabetes mellitus (HCC)     GERD (gastroesophageal reflux disease)     Hyperlipidemia     Hypertension     Migraine     Nausea & vomiting     Obesity     Obstructive sleep apnea     PONV (postoperative nausea and vomiting)     Pre-operative clearance     S/P bariatric surgery 2011       Percocet [oxycodone-acetaminophen], Sulfa antibiotics, Sulfasalazine, Ace inhibitors, Codeine, and Oxycodone-acetaminophen     Past Surgical History:   Procedure Laterality Date    ACROMIOPLASTY      L Shoulder    BACK SURGERY      Thoracic    CERVIX SURGERY  1995    Conization      SECTION      COLPOSCOPY      ENDOMETRIAL ABLATION  2009    GASTRIC BAND N/A 2023    ROBOTIC BAND REMOVAL, TAKE DOWN OF GASTRIC ADHESIONS, LAPAROSCOPIC LYSIS OF ADHESIONS performed by Jean Paul Bess DO at [facility] OR    KIDNEY STONE REMOVAL      LAP BAND  2011    LIPOSUCTION      ERNESTO STEROTACTIC LOC BREAST BIOPSY LEFT Left 11/15/2021    ERNESTO STEROTACTIC LOC BREAST BIOPSY LEFT 11/15/2021 [facility]    TUBAL LIGATION      UPPER GASTROINTESTINAL ENDOSCOPY N/A 2023    EGD BIOPSY performed by Jean Paul Bess DO at [facility] ENDOSCOPY        Prior to Visit Medications    Medication Sig Taking? Authorizing Provider   vitamin D (ERGOCALCIFEROL) 1.25 MG (51299 UT) CAPS capsule Take 1 capsule by mouth once a week Yes

## 2024-04-24 ENCOUNTER — HOSPITAL ENCOUNTER (OUTPATIENT)
Dept: NUCLEAR MEDICINE | Age: 54
Discharge: HOME | End: 2024-04-24
Attending: SURGERY
Payer: COMMERCIAL

## 2024-04-24 VITALS — BODY MASS INDEX: 36.39 KG/M2 | WEIGHT: 212 LBS

## 2024-04-24 DIAGNOSIS — R10.11 RUQ PAIN: ICD-10-CM

## 2024-04-24 PROCEDURE — 6360000004 HC RX CONTRAST MEDICATION: Performed by: SURGERY

## 2024-04-24 PROCEDURE — 78227 HEPATOBIL SYST IMAGE W/DRUG: CPT

## 2024-04-24 PROCEDURE — 3430000000 HC RX DIAGNOSTIC RADIOPHARMACEUTICAL: Performed by: SURGERY

## 2024-04-24 PROCEDURE — 2580000003 HC RX 258: Performed by: SURGERY

## 2024-04-24 PROCEDURE — A9537 TC99M MEBROFENIN: HCPCS | Performed by: SURGERY

## 2024-04-24 RX ORDER — SODIUM CHLORIDE 9 MG/ML
INJECTION, SOLUTION INTRAVENOUS ONCE
Status: COMPLETED | OUTPATIENT
Start: 2024-04-24 | End: 2024-04-24

## 2024-04-24 RX ORDER — SINCALIDE 5 UG/5ML
0.02 INJECTION, POWDER, LYOPHILIZED, FOR SOLUTION INTRAVENOUS ONCE
Status: COMPLETED | OUTPATIENT
Start: 2024-04-24 | End: 2024-04-24

## 2024-04-24 RX ORDER — SODIUM CHLORIDE 0.9 % (FLUSH) 0.9 %
10 SYRINGE (ML) INJECTION PRN
Status: DISCONTINUED | OUTPATIENT
Start: 2024-04-24 | End: 2024-04-25 | Stop reason: HOSPADM

## 2024-04-24 RX ADMIN — SINCALIDE 1.92 MCG: 5 INJECTION, POWDER, LYOPHILIZED, FOR SOLUTION INTRAVENOUS at 08:35

## 2024-04-24 RX ADMIN — SODIUM CHLORIDE 100 ML: 9 INJECTION, SOLUTION INTRAVENOUS at 08:35

## 2024-04-24 RX ADMIN — Medication 10 ML: at 07:40

## 2024-04-24 RX ADMIN — Medication 5.28 MILLICURIE: at 07:40

## 2024-04-25 ENCOUNTER — TELEPHONE (OUTPATIENT)
Dept: SURGERY | Age: 54
End: 2024-04-25

## 2024-04-25 NOTE — TELEPHONE ENCOUNTER
LMON TO CALL FOR RESULTS      ----- Message from Foreign Armijo MD sent at 4/24/2024  7:08 PM EDT -----  The HIDA scan is normal.  We do not need to do anything with her gallbladder at this time.  We can proceed with robotic laparoscopic ventral hernia repair with mesh.  I would prefer that she quit smoking at least 2 weeks prior to the procedure.  ----- Message -----  From: Malick Ferraro Incoming Radiology Results From [facility]  Sent: 4/24/2024  10:12 AM EDT  To: Foreign Armijo MD

## 2024-04-26 ENCOUNTER — TELEPHONE (OUTPATIENT)
Dept: SURGERY | Age: 54
End: 2024-04-26

## 2024-04-26 ENCOUNTER — PREP FOR PROCEDURE (OUTPATIENT)
Dept: SURGERY | Age: 54
End: 2024-04-26

## 2024-04-26 PROBLEM — K43.9 VENTRAL HERNIA: Status: ACTIVE | Noted: 2024-04-26

## 2024-04-26 NOTE — TELEPHONE ENCOUNTER
Patient notified of results and will call to schedule.      ----- Message from Foreign Armijo MD sent at 4/24/2024  7:08 PM EDT -----  The HIDA scan is normal.  We do not need to do anything with her gallbladder at this time.  We can proceed with robotic laparoscopic ventral hernia repair with mesh.  I would prefer that she quit smoking at least 2 weeks prior to the procedure.  ----- Message -----  From: Malick Ferraro Incoming Radiology Results From [facility]  Sent: 4/24/2024  10:12 AM EDT  To: Foreign Armijo MD

## 2024-04-26 NOTE — TELEPHONE ENCOUNTER
Surgery scheduled and patient advised that Dr. Armijo would like for her to quit smoking 2 week prior to surgery and 2 week after.

## 2024-05-15 ENCOUNTER — TELEPHONE (OUTPATIENT)
Dept: SURGERY | Age: 54
End: 2024-05-15

## 2024-05-30 RX ORDER — TIRZEPATIDE 12.5 MG/.5ML
INJECTION, SOLUTION SUBCUTANEOUS
COMMUNITY
Start: 2024-05-02

## 2024-05-30 NOTE — PROGRESS NOTES
Name_______________________________________Printed:____________________  Date and time of surgery_6/6/2024__________0730_____________Arrival Time:__0600___/per office____   1. The instructions given regarding when and if a patient needs to stop oral intake prior to surgery varies.Follow the specific instructions you were given                  __x_Nothing to eat or to drink after Midnight the night before.                   ____Carbo loading or instructions will be given to select patients-if you have been given those instructions -please do the following                           The evening before your surgery after dinner before midnight drink 40 ounces of gatorade.If you are diabetic use sugar free.  The morning of surgery drink 40 ounces of water.This needs to be finished 3 hours prior to your surgery start time.    2. Take the following pills with a small sip of water on the morning of surgery__albuterol inhaler, singulair buspar and omeprazole_________________________________________________                  Do not take blood pressure medications ending in pril or sartan the chleo prior to surgery or the morning of surgery. Dr Rock's patient are not to take any medications the AM of surgery.         3. Aspirin, Ibuprofen, Advil, Naproxen, Vitamin E and other Anti-inflammatory products and supplements should be stopped for 5 -7days before surgery or as directed by your physician.   4. Check with your Doctor regarding stopping Plavix, Coumadin,Eliquis, Lovenox,Effient,Pradaxa,Xarelto, Fragmin or other blood thinners and follow their instructions.   5. Do not smoke, and do not drink any alcoholic beverages 24 hours prior to surgery.  This includes NA Beer.Refrain from the usage of any recreational drugs.   6. You may brush your teeth and gargle the morning of surgery.  DO NOT SWALLOW WATER   7. You MUST make arrangements for a responsible adult to stay on site while you are here and take you home after your

## 2024-06-05 ENCOUNTER — ANESTHESIA EVENT (OUTPATIENT)
Dept: OPERATING ROOM | Age: 54
End: 2024-06-05
Payer: COMMERCIAL

## 2024-06-06 ENCOUNTER — HOSPITAL ENCOUNTER (OUTPATIENT)
Age: 54
Setting detail: OUTPATIENT SURGERY
Discharge: HOME | End: 2024-06-06
Attending: SURGERY | Admitting: SURGERY
Payer: COMMERCIAL

## 2024-06-06 ENCOUNTER — ANESTHESIA (OUTPATIENT)
Dept: OPERATING ROOM | Age: 54
End: 2024-06-06
Payer: COMMERCIAL

## 2024-06-06 VITALS
RESPIRATION RATE: 14 BRPM | TEMPERATURE: 97.1 F | BODY MASS INDEX: 36.7 KG/M2 | DIASTOLIC BLOOD PRESSURE: 73 MMHG | SYSTOLIC BLOOD PRESSURE: 102 MMHG | HEIGHT: 64 IN | OXYGEN SATURATION: 97 % | HEART RATE: 65 BPM | WEIGHT: 215 LBS

## 2024-06-06 DIAGNOSIS — K43.9 VENTRAL HERNIA WITHOUT OBSTRUCTION OR GANGRENE: Primary | ICD-10-CM

## 2024-06-06 LAB
GLUCOSE BLD-MCNC: 155 MG/DL (ref 70–99)
GLUCOSE BLD-MCNC: 160 MG/DL (ref 70–99)
PERFORMED ON: ABNORMAL
PERFORMED ON: ABNORMAL

## 2024-06-06 PROCEDURE — C9290 INJ, BUPIVACAINE LIPOSOME: HCPCS | Performed by: SURGERY

## 2024-06-06 PROCEDURE — 2709999900 HC NON-CHARGEABLE SUPPLY: Performed by: SURGERY

## 2024-06-06 PROCEDURE — 3600000009 HC SURGERY ROBOT BASE: Performed by: SURGERY

## 2024-06-06 PROCEDURE — 2500000003 HC RX 250 WO HCPCS: Performed by: NURSE ANESTHETIST, CERTIFIED REGISTERED

## 2024-06-06 PROCEDURE — S2900 ROBOTIC SURGICAL SYSTEM: HCPCS | Performed by: SURGERY

## 2024-06-06 PROCEDURE — 2580000003 HC RX 258: Performed by: SURGERY

## 2024-06-06 PROCEDURE — 6370000000 HC RX 637 (ALT 250 FOR IP)

## 2024-06-06 PROCEDURE — 6360000002 HC RX W HCPCS: Performed by: ANESTHESIOLOGY

## 2024-06-06 PROCEDURE — 3700000000 HC ANESTHESIA ATTENDED CARE: Performed by: SURGERY

## 2024-06-06 PROCEDURE — 49593 RPR AA HRN 1ST 3-10 RDC: CPT | Performed by: SURGERY

## 2024-06-06 PROCEDURE — 3700000001 HC ADD 15 MINUTES (ANESTHESIA): Performed by: SURGERY

## 2024-06-06 PROCEDURE — 6360000002 HC RX W HCPCS

## 2024-06-06 PROCEDURE — 6360000002 HC RX W HCPCS: Performed by: NURSE ANESTHETIST, CERTIFIED REGISTERED

## 2024-06-06 PROCEDURE — 7100000011 HC PHASE II RECOVERY - ADDTL 15 MIN: Performed by: SURGERY

## 2024-06-06 PROCEDURE — 7100000000 HC PACU RECOVERY - FIRST 15 MIN: Performed by: SURGERY

## 2024-06-06 PROCEDURE — 7100000001 HC PACU RECOVERY - ADDTL 15 MIN: Performed by: SURGERY

## 2024-06-06 PROCEDURE — 6370000000 HC RX 637 (ALT 250 FOR IP): Performed by: SURGERY

## 2024-06-06 PROCEDURE — 6360000002 HC RX W HCPCS: Performed by: SURGERY

## 2024-06-06 PROCEDURE — 3600000019 HC SURGERY ROBOT ADDTL 15MIN: Performed by: SURGERY

## 2024-06-06 PROCEDURE — 7100000010 HC PHASE II RECOVERY - FIRST 15 MIN: Performed by: SURGERY

## 2024-06-06 PROCEDURE — C1781 MESH (IMPLANTABLE): HCPCS | Performed by: SURGERY

## 2024-06-06 PROCEDURE — A4217 STERILE WATER/SALINE, 500 ML: HCPCS | Performed by: SURGERY

## 2024-06-06 DEVICE — MESH HERN DIA6IN CIR W/ ECHO PS POS SYS VENTRALIGHT ST: Type: IMPLANTABLE DEVICE | Site: ABDOMEN | Status: FUNCTIONAL

## 2024-06-06 RX ORDER — SODIUM CHLORIDE 9 MG/ML
INJECTION, SOLUTION INTRAVENOUS PRN
Status: DISCONTINUED | OUTPATIENT
Start: 2024-06-06 | End: 2024-06-06 | Stop reason: HOSPADM

## 2024-06-06 RX ORDER — ONDANSETRON 2 MG/ML
4 INJECTION INTRAMUSCULAR; INTRAVENOUS
Status: COMPLETED | OUTPATIENT
Start: 2024-06-06 | End: 2024-06-06

## 2024-06-06 RX ORDER — DEXAMETHASONE SODIUM PHOSPHATE 4 MG/ML
INJECTION, SOLUTION INTRA-ARTICULAR; INTRALESIONAL; INTRAMUSCULAR; INTRAVENOUS; SOFT TISSUE PRN
Status: DISCONTINUED | OUTPATIENT
Start: 2024-06-06 | End: 2024-06-06 | Stop reason: SDUPTHER

## 2024-06-06 RX ORDER — HYDROCODONE BITARTRATE AND ACETAMINOPHEN 5; 325 MG/1; MG/1
1-2 TABLET ORAL EVERY 4 HOURS PRN
Qty: 20 TABLET | Refills: 0 | Status: SHIPPED | OUTPATIENT
Start: 2024-06-06 | End: 2024-06-09

## 2024-06-06 RX ORDER — KETOROLAC TROMETHAMINE 30 MG/ML
INJECTION, SOLUTION INTRAMUSCULAR; INTRAVENOUS PRN
Status: DISCONTINUED | OUTPATIENT
Start: 2024-06-06 | End: 2024-06-06 | Stop reason: SDUPTHER

## 2024-06-06 RX ORDER — ONDANSETRON 2 MG/ML
INJECTION INTRAMUSCULAR; INTRAVENOUS PRN
Status: DISCONTINUED | OUTPATIENT
Start: 2024-06-06 | End: 2024-06-06 | Stop reason: SDUPTHER

## 2024-06-06 RX ORDER — ACETAMINOPHEN 500 MG
1000 TABLET ORAL ONCE
Status: COMPLETED | OUTPATIENT
Start: 2024-06-06 | End: 2024-06-06

## 2024-06-06 RX ORDER — FENTANYL CITRATE 50 UG/ML
INJECTION, SOLUTION INTRAMUSCULAR; INTRAVENOUS PRN
Status: DISCONTINUED | OUTPATIENT
Start: 2024-06-06 | End: 2024-06-06 | Stop reason: SDUPTHER

## 2024-06-06 RX ORDER — ACETAMINOPHEN 500 MG
TABLET ORAL
Status: COMPLETED
Start: 2024-06-06 | End: 2024-06-06

## 2024-06-06 RX ORDER — FENTANYL CITRATE 50 UG/ML
25 INJECTION, SOLUTION INTRAMUSCULAR; INTRAVENOUS EVERY 5 MIN PRN
Status: DISCONTINUED | OUTPATIENT
Start: 2024-06-06 | End: 2024-06-06 | Stop reason: HOSPADM

## 2024-06-06 RX ORDER — SCOLOPAMINE TRANSDERMAL SYSTEM 1 MG/1
1 PATCH, EXTENDED RELEASE TRANSDERMAL ONCE
Status: DISCONTINUED | OUTPATIENT
Start: 2024-06-06 | End: 2024-06-06 | Stop reason: HOSPADM

## 2024-06-06 RX ORDER — HYDROCODONE BITARTRATE AND ACETAMINOPHEN 5; 325 MG/1; MG/1
1 TABLET ORAL ONCE
Status: COMPLETED | OUTPATIENT
Start: 2024-06-06 | End: 2024-06-06

## 2024-06-06 RX ORDER — PHENYLEPHRINE HCL IN 0.9% NACL 1 MG/10 ML
SYRINGE (ML) INTRAVENOUS PRN
Status: DISCONTINUED | OUTPATIENT
Start: 2024-06-06 | End: 2024-06-06 | Stop reason: SDUPTHER

## 2024-06-06 RX ORDER — MIDAZOLAM HYDROCHLORIDE 1 MG/ML
INJECTION INTRAMUSCULAR; INTRAVENOUS PRN
Status: DISCONTINUED | OUTPATIENT
Start: 2024-06-06 | End: 2024-06-06 | Stop reason: SDUPTHER

## 2024-06-06 RX ORDER — PROCHLORPERAZINE EDISYLATE 5 MG/ML
5 INJECTION INTRAMUSCULAR; INTRAVENOUS
Status: DISCONTINUED | OUTPATIENT
Start: 2024-06-06 | End: 2024-06-06 | Stop reason: HOSPADM

## 2024-06-06 RX ORDER — SODIUM CHLORIDE 0.9 % (FLUSH) 0.9 %
5-40 SYRINGE (ML) INJECTION EVERY 12 HOURS SCHEDULED
Status: DISCONTINUED | OUTPATIENT
Start: 2024-06-06 | End: 2024-06-06 | Stop reason: HOSPADM

## 2024-06-06 RX ORDER — APREPITANT 40 MG/1
40 CAPSULE ORAL ONCE
Status: COMPLETED | OUTPATIENT
Start: 2024-06-06 | End: 2024-06-06

## 2024-06-06 RX ORDER — SCOLOPAMINE TRANSDERMAL SYSTEM 1 MG/1
PATCH, EXTENDED RELEASE TRANSDERMAL
Status: DISCONTINUED
Start: 2024-06-06 | End: 2024-06-06 | Stop reason: HOSPADM

## 2024-06-06 RX ORDER — SODIUM CHLORIDE, SODIUM LACTATE, POTASSIUM CHLORIDE, CALCIUM CHLORIDE 600; 310; 30; 20 MG/100ML; MG/100ML; MG/100ML; MG/100ML
INJECTION, SOLUTION INTRAVENOUS CONTINUOUS
Status: DISCONTINUED | OUTPATIENT
Start: 2024-06-06 | End: 2024-06-06 | Stop reason: HOSPADM

## 2024-06-06 RX ORDER — MAGNESIUM HYDROXIDE 1200 MG/15ML
LIQUID ORAL CONTINUOUS PRN
Status: COMPLETED | OUTPATIENT
Start: 2024-06-06 | End: 2024-06-06

## 2024-06-06 RX ORDER — METHOCARBAMOL 100 MG/ML
500 INJECTION, SOLUTION INTRAMUSCULAR; INTRAVENOUS ONCE
Status: COMPLETED | OUTPATIENT
Start: 2024-06-06 | End: 2024-06-06

## 2024-06-06 RX ORDER — SODIUM CHLORIDE 0.9 % (FLUSH) 0.9 %
5-40 SYRINGE (ML) INJECTION PRN
Status: DISCONTINUED | OUTPATIENT
Start: 2024-06-06 | End: 2024-06-06 | Stop reason: HOSPADM

## 2024-06-06 RX ORDER — LABETALOL HYDROCHLORIDE 5 MG/ML
10 INJECTION, SOLUTION INTRAVENOUS
Status: DISCONTINUED | OUTPATIENT
Start: 2024-06-06 | End: 2024-06-06 | Stop reason: HOSPADM

## 2024-06-06 RX ORDER — APREPITANT 40 MG/1
CAPSULE ORAL
Status: COMPLETED
Start: 2024-06-06 | End: 2024-06-06

## 2024-06-06 RX ORDER — ROCURONIUM BROMIDE 10 MG/ML
INJECTION, SOLUTION INTRAVENOUS PRN
Status: DISCONTINUED | OUTPATIENT
Start: 2024-06-06 | End: 2024-06-06 | Stop reason: SDUPTHER

## 2024-06-06 RX ORDER — PROPOFOL 10 MG/ML
INJECTION, EMULSION INTRAVENOUS PRN
Status: DISCONTINUED | OUTPATIENT
Start: 2024-06-06 | End: 2024-06-06 | Stop reason: SDUPTHER

## 2024-06-06 RX ORDER — CEFAZOLIN 2 G/1
INJECTION, POWDER, FOR SOLUTION INTRAMUSCULAR; INTRAVENOUS
Status: DISCONTINUED
Start: 2024-06-06 | End: 2024-06-06 | Stop reason: HOSPADM

## 2024-06-06 RX ORDER — LIDOCAINE HYDROCHLORIDE 10 MG/ML
1 INJECTION, SOLUTION EPIDURAL; INFILTRATION; INTRACAUDAL; PERINEURAL
Status: DISCONTINUED | OUTPATIENT
Start: 2024-06-06 | End: 2024-06-06 | Stop reason: HOSPADM

## 2024-06-06 RX ORDER — HYDROMORPHONE HYDROCHLORIDE 2 MG/ML
0.5 INJECTION, SOLUTION INTRAMUSCULAR; INTRAVENOUS; SUBCUTANEOUS EVERY 5 MIN PRN
Status: DISCONTINUED | OUTPATIENT
Start: 2024-06-06 | End: 2024-06-06 | Stop reason: HOSPADM

## 2024-06-06 RX ORDER — LIDOCAINE HYDROCHLORIDE 20 MG/ML
INJECTION, SOLUTION EPIDURAL; INFILTRATION; INTRACAUDAL; PERINEURAL PRN
Status: DISCONTINUED | OUTPATIENT
Start: 2024-06-06 | End: 2024-06-06 | Stop reason: SDUPTHER

## 2024-06-06 RX ORDER — GLYCOPYRROLATE 0.2 MG/ML
INJECTION INTRAMUSCULAR; INTRAVENOUS PRN
Status: DISCONTINUED | OUTPATIENT
Start: 2024-06-06 | End: 2024-06-06 | Stop reason: SDUPTHER

## 2024-06-06 RX ORDER — HYDRALAZINE HYDROCHLORIDE 20 MG/ML
10 INJECTION INTRAMUSCULAR; INTRAVENOUS
Status: DISCONTINUED | OUTPATIENT
Start: 2024-06-06 | End: 2024-06-06 | Stop reason: HOSPADM

## 2024-06-06 RX ADMIN — MIDAZOLAM 2 MG: 1 INJECTION INTRAMUSCULAR; INTRAVENOUS at 07:30

## 2024-06-06 RX ADMIN — SUGAMMADEX 200 MG: 100 INJECTION, SOLUTION INTRAVENOUS at 08:56

## 2024-06-06 RX ADMIN — SODIUM CHLORIDE: 9 INJECTION, SOLUTION INTRAVENOUS at 06:39

## 2024-06-06 RX ADMIN — Medication 100 MCG: at 08:27

## 2024-06-06 RX ADMIN — Medication 100 MCG: at 08:41

## 2024-06-06 RX ADMIN — LIDOCAINE HYDROCHLORIDE 100 MG: 20 INJECTION, SOLUTION EPIDURAL; INFILTRATION; INTRACAUDAL; PERINEURAL at 07:38

## 2024-06-06 RX ADMIN — FENTANYL CITRATE 50 MCG: 50 INJECTION, SOLUTION INTRAMUSCULAR; INTRAVENOUS at 08:01

## 2024-06-06 RX ADMIN — PROPOFOL 200 MG: 10 INJECTION, EMULSION INTRAVENOUS at 07:38

## 2024-06-06 RX ADMIN — FENTANYL CITRATE 25 MCG: 0.05 INJECTION, SOLUTION INTRAMUSCULAR; INTRAVENOUS at 10:01

## 2024-06-06 RX ADMIN — ONDANSETRON 4 MG: 2 INJECTION INTRAMUSCULAR; INTRAVENOUS at 10:16

## 2024-06-06 RX ADMIN — ONDANSETRON 4 MG: 2 INJECTION INTRAMUSCULAR; INTRAVENOUS at 08:45

## 2024-06-06 RX ADMIN — FENTANYL CITRATE 50 MCG: 50 INJECTION, SOLUTION INTRAMUSCULAR; INTRAVENOUS at 07:38

## 2024-06-06 RX ADMIN — KETOROLAC TROMETHAMINE 30 MG: 60 INJECTION, SOLUTION INTRAMUSCULAR at 08:47

## 2024-06-06 RX ADMIN — ROCURONIUM BROMIDE 50 MG: 10 INJECTION INTRAVENOUS at 07:38

## 2024-06-06 RX ADMIN — Medication 100 MCG: at 08:14

## 2024-06-06 RX ADMIN — Medication 100 MCG: at 08:18

## 2024-06-06 RX ADMIN — HYDROMORPHONE HYDROCHLORIDE 0.5 MG: 2 INJECTION, SOLUTION INTRAMUSCULAR; INTRAVENOUS; SUBCUTANEOUS at 09:22

## 2024-06-06 RX ADMIN — HYDROMORPHONE HYDROCHLORIDE 0.5 MG: 2 INJECTION, SOLUTION INTRAMUSCULAR; INTRAVENOUS; SUBCUTANEOUS at 09:33

## 2024-06-06 RX ADMIN — FENTANYL CITRATE 25 MCG: 0.05 INJECTION, SOLUTION INTRAMUSCULAR; INTRAVENOUS at 09:56

## 2024-06-06 RX ADMIN — APREPITANT 40 MG: 40 CAPSULE ORAL at 07:20

## 2024-06-06 RX ADMIN — METHOCARBAMOL 500 MG: 100 INJECTION INTRAMUSCULAR; INTRAVENOUS at 10:05

## 2024-06-06 RX ADMIN — HYDROMORPHONE HYDROCHLORIDE 0.5 MG: 2 INJECTION, SOLUTION INTRAMUSCULAR; INTRAVENOUS; SUBCUTANEOUS at 09:09

## 2024-06-06 RX ADMIN — CEFAZOLIN 2000 MG: 2 INJECTION, POWDER, FOR SOLUTION INTRAMUSCULAR; INTRAVENOUS at 07:31

## 2024-06-06 RX ADMIN — DEXAMETHASONE SODIUM PHOSPHATE 8 MG: 4 INJECTION, SOLUTION INTRAMUSCULAR; INTRAVENOUS at 07:42

## 2024-06-06 RX ADMIN — ACETAMINOPHEN 1000 MG: 500 TABLET ORAL at 07:20

## 2024-06-06 RX ADMIN — ROCURONIUM BROMIDE 50 MG: 10 INJECTION INTRAVENOUS at 08:07

## 2024-06-06 RX ADMIN — SODIUM CHLORIDE: 9 INJECTION, SOLUTION INTRAVENOUS at 08:45

## 2024-06-06 RX ADMIN — Medication 100 MCG: at 07:57

## 2024-06-06 RX ADMIN — Medication 100 MCG: at 08:31

## 2024-06-06 RX ADMIN — HYDROCODONE BITARTRATE AND ACETAMINOPHEN 1 TABLET: 5; 325 TABLET ORAL at 11:00

## 2024-06-06 RX ADMIN — Medication 1000 MG: at 07:20

## 2024-06-06 RX ADMIN — GLYCOPYRROLATE 0.2 MG: 0.2 INJECTION INTRAMUSCULAR; INTRAVENOUS at 08:30

## 2024-06-06 RX ADMIN — CEFAZOLIN 2000 MG: 2 INJECTION, POWDER, FOR SOLUTION INTRAMUSCULAR; INTRAVENOUS at 07:42

## 2024-06-06 ASSESSMENT — PAIN SCALES - GENERAL
PAINLEVEL_OUTOF10: 0
PAINLEVEL_OUTOF10: 10
PAINLEVEL_OUTOF10: 9
PAINLEVEL_OUTOF10: 10
PAINLEVEL_OUTOF10: 8
PAINLEVEL_OUTOF10: 5
PAINLEVEL_OUTOF10: 8

## 2024-06-06 ASSESSMENT — PAIN DESCRIPTION - DESCRIPTORS
DESCRIPTORS: STABBING;SHOOTING
DESCRIPTORS: STABBING;SHOOTING
DESCRIPTORS: STABBING;SHARP
DESCRIPTORS: SHARP;STABBING
DESCRIPTORS: DISCOMFORT

## 2024-06-06 ASSESSMENT — PAIN DESCRIPTION - LOCATION
LOCATION: ABDOMEN

## 2024-06-06 ASSESSMENT — LIFESTYLE VARIABLES: SMOKING_STATUS: 0

## 2024-06-06 ASSESSMENT — ENCOUNTER SYMPTOMS: SHORTNESS OF BREATH: 0

## 2024-06-06 ASSESSMENT — PAIN - FUNCTIONAL ASSESSMENT: PAIN_FUNCTIONAL_ASSESSMENT: 0-10

## 2024-06-06 NOTE — ANESTHESIA POSTPROCEDURE EVALUATION
Department of Anesthesiology  Postprocedure Note    Patient: Gloria Blancas  MRN: [id number]  YOB: 1970  Date of evaluation: 6/6/2024    Procedure Summary       Date: 06/06/24 Room / Location: [address]    Anesthesia Start: 0733 Anesthesia Stop: 0910    Procedure: ROBOT ASSISTED LAPAROSCOPIC VENTRAL HERNIA REPAIR WITH MESH (Abdomen) Diagnosis:       Ventral hernia      (Ventral hernia [K43.9])    Surgeons: Foreign Armijo MD Responsible Provider: Tyron Collado MD    Anesthesia Type: general ASA Status: 3            Anesthesia Type: No value filed.    Amrita Phase I: Amrita Score: 10    Amrita Phase II: Amrita Score: 10    Anesthesia Post Evaluation    Patient location during evaluation: PACU  Patient participation: complete - patient participated  Level of consciousness: awake and alert  Airway patency: patent  Nausea & Vomiting: no nausea and no vomiting  Cardiovascular status: hemodynamically stable  Respiratory status: acceptable  Hydration status: stable  Multimodal analgesia pain management approach  Pain management: adequate    No notable events documented.

## 2024-06-06 NOTE — BRIEF OP NOTE
Brief Postoperative Note      Patient: Gloria Blancas  YOB: 1970  MRN: [id number]    Date of Procedure: 6/6/2024    Pre-Op Diagnosis Codes:     * Ventral hernia [K43.9]    Post-Op Diagnosis: Same       Procedure(s):  ROBOT ASSISTED LAPAROSCOPIC VENTRAL HERNIA REPAIR WITH MESH    Surgeon(s):  Foreign Armijo MD    Assistant:  Surgical Assistant: Roque Forman    Anesthesia: General    Estimated Blood Loss (mL): Minimal    Complications: None    Specimens:   * No specimens in log *    Implants:  Implant Name Type Inv. Item Serial No.  Lot No. LRB No. Used Action   MESH AIXA DIA6IN CIR W/ ECHO PS POS SYS VENTRALIGHT ST - [id number]  MESH AIXA DIA6IN CIR W/ ECHO PS POS SYS VENTRALIGHT ST  BARD DAVOL-WD [id number] N/A 1 Implanted         Drains:   [REMOVED] Urinary Catheter 06/06/24 2 Way;Manz[place] (Removed)       Findings:  Infection Present At Time Of Surgery (PATOS) (choose all levels that have infection present):  No infection present  Other Findings: ventral hernia repaired 4 cm defect    Electronically signed by Foreign Armijo MD on 6/6/2024 at 8:46 AM

## 2024-06-06 NOTE — PROGRESS NOTES
Patient has transitioned out of phase 1 care. Patient's pain and nausea is tolerable at this time. X4 abd incisions w/ surgical glue. Abdominal binder is over top of incisions. VSS.

## 2024-06-06 NOTE — H&P
MD Haleigh       Principal Problem:    Ventral hernia  Resolved Problems:    * No resolved hospital problems. *      Blood pressure (!) 144/71, pulse 80, temperature 97.3 °F (36.3 °C), temperature source Temporal, resp. rate 14, height 1.626 m (5' 4\"), weight 97.5 kg (215 lb), last menstrual period 08/01/2017, SpO2 97 %.    Review of Systems    Physical Exam  Cardiovascular:      Rate and Rhythm: Normal rate and regular rhythm.   Pulmonary:      Effort: Pulmonary effort is normal.      Breath sounds: Normal breath sounds.         Assessment:  Ventral hernia      Plan:  Ventral hernia repair    Foreign Armijo MD  6/6/2024

## 2024-06-06 NOTE — OP NOTE
[place] [id number]                            OPERATIVE REPORT      PATIENT NAME: JOVANNY JIMENEZ            : 1970  MED REC NO: [id number]                      ROOM: OR  ACCOUNT NO: [id number]                       ADMIT DATE: 2024  PROVIDER: Foreign Armijo MD      DATE OF PROCEDURE:      SURGEON:  Foreign Armijo MD    PREOPERATIVE DIAGNOSIS:  Ventral hernia.    POSTOPERATIVE DIAGNOSIS:  Ventral hernia.    PROCEDURE:  Robotic laparoscopic repair of 4 cm ventral hernia with mesh.    ANESTHESIA:  General endotracheal and local.    ESTIMATED BLOOD LOSS:  Minimal.    COMPLICATIONS:  None.    SPECIMEN:  None.    OPERATIVE INDICATIONS AND CONSENT:  The patient is a 54-year-old female with a ventral hernia in the right upper abdomen.  She is brought in today for repair of the hernia.  She was explained the risks, benefits, possible complications including risk of bleeding, bowel injury, hernia recurrence, or infection requiring mesh removal.    DETAILS OF PROCEDURE:  The patient was brought to the operative suite, placed in a supine position on the operative table.  After general endotracheal anesthesia, she was prepped and draped in the usual sterile fashion.    We made an 8 mm transverse incision in the left upper quadrant of the abdomen.  A Veress needle was passed to the peritoneal cavity and after adequate insufflation, an 8 mm Optiview trocar was placed at this site.  An 8 mm lateral mid left abdominal trocar was placed followed by an 8 mm left lower quadrant trocar.  The bed was tilted 6 degrees toward the right side and then the robot was docked.  The patient had a single ventral hernia defect.  It had some omentum within it.  The omentum was taken down with cautery.  The defect measured 4 x 4 cm.    We selected a 15 x 15 cm Bard Echo mesh.  We made a 12 mm incision in the region of the hernia sac.  A 12

## 2024-06-06 NOTE — DISCHARGE INSTRUCTIONS
Postoperative Instructions      Contact information     Office number - [phone]  Office hours are 8 am - 5 pm  Monday - Friday  Contact the doctor on call during the evenings ( 5 pm - 8 am ) or on Weekends for urgent or emergent issues by using the main office number ( [phone] ).  Please hold routine questions until normal business hours.        Wound care    The incisions are closed with dissolvable sutures which are beneath the skin. Surgical glue is then applied to the skin. The glue is purple in color and should be left undisturbed until your follow-up appointment.  No bandages are required over the surgical glue.  It is okay to shower but do not bathe in a bathtub.  Gently wash over the incisions with soap and water and then pat them dry with a towel. Contact the office for redness of the skin surrounding the incision or if there is drainage of pus.        Activities    It is generally okay to go up and down stairs. Please be careful as your gate may be unsteady from the surgery or pain medications.    Driving: Do not drive while on narcotic pain medications or while still under the effect of narcotic pain medications. Make sure that you are moving comfortably and not limited by postoperative pain or weakness that would make it difficult to react in an emergency situation.    Exercise : The main purpose of the activity restrictions is to reduce the risk of developing a hernia at an incision site.                   Do not lift greater than 25 lbs                   Avoid strenuous abdominal exercises- sit ups, core workouts                   Light cardiovascular exercise is generally okay                   Ask your doctor about the length of the exercise restrictions    Follow up    Please call the office for any concerns between the time of surgery and your follow up appointment.  For your convenience, we ask that you schedule your follow up appointment about 2 weeks from the time of surgery at a time

## 2024-06-06 NOTE — PROGRESS NOTES
Discharge instructions reviewed and understanding verbalized per pt/family with copy given. All home medications/new prescriptions have been reviewed, questions answered and patient/family state understanding. Medication information sheet provided for new prescriptions received when applicable. Patient's peripheral IV has been removed without complications. Patient has left unit with all belongings. Patient has left with boyfriend who is providing transportation.

## 2024-06-06 NOTE — ANESTHESIA PRE PROCEDURE
Department of Anesthesiology  Preprocedure Note       Name:  Gloria Blancas   Age:  54 y.o.  :  1970                                          MRN:  [id number]         Date:  2024      Surgeon: Surgeon(s):  Foreign Armijo MD    Procedure: Procedure(s):  ROBOT ASSISTED LAPAROSCOPIC VENTRAL HERNIA REPAIR WITH MESH    Medications prior to admission:   Prior to Admission medications    Medication Sig Start Date End Date Taking? Authorizing Provider   MOUNJARO 12.5 MG/0.5ML SOPN SC injection INJECT 12.5MG UNDER THE SKIN WEEKLY 24  Yes Haleigh Brady MD   vitamin D (ERGOCALCIFEROL) 1.25 MG (57101 UT) CAPS capsule Take 1 capsule by mouth once a week 23   Haleigh Brady MD   busPIRone (BUSPAR) 5 MG tablet Take 1 tablet by mouth 3 times daily  Patient not taking: Reported on 2024 3/30/23   Haleigh Brady MD   omeprazole (PRILOSEC OTC) 20 MG tablet Take 1 tablet by mouth daily    Haleigh Brady MD   fenofibrate micronized (LOFIBRA) 134 MG capsule TAKE 1 CAPLET BY MOUTH ONCE DAILY 1/10/23   Haleigh Brady MD   Cholecalciferol (VITAMIN D3) 1.25 MG (73134 UT) CAPS Take by mouth    Haleigh Brady MD   folic acid (FOLVITE) 1 MG tablet Take 1 tablet by mouth daily    Haleigh Brady MD   atorvastatin (LIPITOR) 10 MG tablet Take 1 tablet by mouth daily    Haleigh Brady MD   cetirizine (ZYRTEC) 10 MG tablet Take 1 tablet by mouth daily    Haleigh Brady MD   montelukast (SINGULAIR) 10 MG tablet Take 1 tablet by mouth nightly    Haleigh Brady MD   albuterol (PROVENTIL) (2.5 MG/3ML) 0.083% nebulizer solution Inhale 3 mLs into the lungs every 4 hours as needed 20   Haleigh Brady MD       Current medications:    Current Facility-Administered Medications   Medication Dose Route Frequency Provider Last Rate Last Admin    sodium chloride flush 0.9 % injection 5-40 mL  5-40 mL IntraVENous 2 times per day Foreign Armijo

## 2024-06-06 NOTE — PROGRESS NOTES
Patient to PACU from OR. Patient arousable to voice and appears to be in a lot of pain. VSS. X4 abd incisions w/ surgical glue and abd binder on. Ice over top of abd binder.

## 2024-06-07 ENCOUNTER — TELEPHONE (OUTPATIENT)
Dept: SURGERY | Age: 54
End: 2024-06-07

## 2024-06-07 NOTE — TELEPHONE ENCOUNTER
Advised patient to take 2 Norco and 3-4 hours later to take Ibuprofen 800 mg and alternate per Dr. Armijo. Patient states she is already doing this and it is not helping. States she was told at the hospital that if  pain medication did not help, to call and he would RX something else. Will speak with Dr Armijo and advise patient of next step.

## 2024-06-07 NOTE — TELEPHONE ENCOUNTER
Pt called into the office this morning stating that she has been up all night with pain, the Norco prescribed is not helping her. She said that she has reactions to Percocet but it only makes her itch. I asked her if she was only taking the Norco, she stated yes.     I was advising her to start taking Ibuprofen 800 mg alternating it with the Norco every 3 hours for the pain and to take with a little bit of food. A man in the background starting talking stating that Ibuprofen isn't helping her however she told me that she was only taking the Norco. I stated that Ibuprofen helps with inflammation and pain and he cut me off saying it doesn't work. I asked if I was on speaker phone and quickly the call was disconnected.

## 2024-06-10 ENCOUNTER — TELEPHONE (OUTPATIENT)
Dept: SURGERY | Age: 54
End: 2024-06-10

## 2024-06-10 ENCOUNTER — APPOINTMENT (OUTPATIENT)
Dept: CT IMAGING | Age: 54
End: 2024-06-10
Payer: COMMERCIAL

## 2024-06-10 ENCOUNTER — HOSPITAL ENCOUNTER (INPATIENT)
Age: 54
LOS: 5 days | Discharge: HOME | End: 2024-06-15
Attending: INTERNAL MEDICINE | Admitting: INTERNAL MEDICINE
Payer: COMMERCIAL

## 2024-06-10 DIAGNOSIS — N30.00 ACUTE CYSTITIS WITHOUT HEMATURIA: ICD-10-CM

## 2024-06-10 DIAGNOSIS — G89.18 POSTOPERATIVE ABDOMINAL PAIN: Primary | ICD-10-CM

## 2024-06-10 DIAGNOSIS — R10.9 POSTOPERATIVE ABDOMINAL PAIN: Primary | ICD-10-CM

## 2024-06-10 DIAGNOSIS — R91.1 LUNG NODULE: ICD-10-CM

## 2024-06-10 LAB
ALBUMIN SERPL-MCNC: 3.9 G/DL (ref 3.4–5)
ALBUMIN/GLOB SERPL: 1.2 {RATIO} (ref 1.1–2.2)
ALP SERPL-CCNC: 76 U/L (ref 40–129)
ALT SERPL-CCNC: 13 U/L (ref 10–40)
ANION GAP SERPL CALCULATED.3IONS-SCNC: 10 MMOL/L (ref 3–16)
AST SERPL-CCNC: 14 U/L (ref 15–37)
BACTERIA URNS QL MICRO: ABNORMAL /HPF
BASOPHILS # BLD: 0 K/UL (ref 0–0.2)
BASOPHILS NFR BLD: 0.3 %
BILIRUB SERPL-MCNC: 0.3 MG/DL (ref 0–1)
BILIRUB UR QL STRIP.AUTO: NEGATIVE
BUN SERPL-MCNC: 10 MG/DL (ref 7–20)
CALCIUM SERPL-MCNC: 9.4 MG/DL (ref 8.3–10.6)
CHLORIDE SERPL-SCNC: 98 MMOL/L (ref 99–110)
CLARITY UR: ABNORMAL
CO2 SERPL-SCNC: 31 MMOL/L (ref 21–32)
COLOR UR: YELLOW
CREAT SERPL-MCNC: 0.7 MG/DL (ref 0.6–1.1)
DEPRECATED RDW RBC AUTO: 14.9 % (ref 12.4–15.4)
EOSINOPHIL # BLD: 0.4 K/UL (ref 0–0.6)
EOSINOPHIL NFR BLD: 3.4 %
EPI CELLS #/AREA URNS AUTO: 11 /HPF (ref 0–5)
GFR SERPLBLD CREATININE-BSD FMLA CKD-EPI: >90 ML/MIN/{1.73_M2}
GLUCOSE SERPL-MCNC: 119 MG/DL (ref 70–99)
GLUCOSE UR STRIP.AUTO-MCNC: NEGATIVE MG/DL
HCT VFR BLD AUTO: 34.2 % (ref 36–48)
HGB BLD-MCNC: 11.1 G/DL (ref 12–16)
HGB UR QL STRIP.AUTO: NEGATIVE
HYALINE CASTS #/AREA URNS AUTO: 0 /LPF (ref 0–8)
KETONES UR STRIP.AUTO-MCNC: NEGATIVE MG/DL
LACTATE BLDV-SCNC: 1.2 MMOL/L (ref 0.4–1.9)
LEUKOCYTE ESTERASE UR QL STRIP.AUTO: ABNORMAL
LIPASE SERPL-CCNC: 16 U/L (ref 13–60)
LYMPHOCYTES # BLD: 2.3 K/UL (ref 1–5.1)
LYMPHOCYTES NFR BLD: 20 %
MAGNESIUM SERPL-MCNC: 2 MG/DL (ref 1.8–2.4)
MCH RBC QN AUTO: 27.6 PG (ref 26–34)
MCHC RBC AUTO-ENTMCNC: 32.6 G/DL (ref 31–36)
MCV RBC AUTO: 84.8 FL (ref 80–100)
MONOCYTES # BLD: 0.6 K/UL (ref 0–1.3)
MONOCYTES NFR BLD: 4.9 %
NEUTROPHILS # BLD: 8.1 K/UL (ref 1.7–7.7)
NEUTROPHILS NFR BLD: 71.4 %
NITRITE UR QL STRIP.AUTO: NEGATIVE
PH UR STRIP.AUTO: 8 [PH] (ref 5–8)
PLATELET # BLD AUTO: 299 K/UL (ref 135–450)
PMV BLD AUTO: 8.1 FL (ref 5–10.5)
POTASSIUM SERPL-SCNC: 4.2 MMOL/L (ref 3.5–5.1)
PROCALCITONIN SERPL IA-MCNC: 0.07 NG/ML (ref 0–0.15)
PROT SERPL-MCNC: 7.1 G/DL (ref 6.4–8.2)
PROT UR STRIP.AUTO-MCNC: ABNORMAL MG/DL
RBC # BLD AUTO: 4.04 M/UL (ref 4–5.2)
RBC CLUMPS #/AREA URNS AUTO: 2 /HPF (ref 0–4)
SODIUM SERPL-SCNC: 139 MMOL/L (ref 136–145)
SP GR UR STRIP.AUTO: 1.02 (ref 1–1.03)
UA COMPLETE W REFLEX CULTURE PNL UR: ABNORMAL
UA DIPSTICK W REFLEX MICRO PNL UR: YES
URN SPEC COLLECT METH UR: ABNORMAL
UROBILINOGEN UR STRIP-ACNC: 1 E.U./DL
WBC # BLD AUTO: 11.4 K/UL (ref 4–11)
WBC #/AREA URNS AUTO: 8 /HPF (ref 0–5)

## 2024-06-10 PROCEDURE — 87086 URINE CULTURE/COLONY COUNT: CPT

## 2024-06-10 PROCEDURE — 1200000000 HC SEMI PRIVATE

## 2024-06-10 PROCEDURE — 83605 ASSAY OF LACTIC ACID: CPT

## 2024-06-10 PROCEDURE — 96375 TX/PRO/DX INJ NEW DRUG ADDON: CPT

## 2024-06-10 PROCEDURE — 85025 COMPLETE CBC W/AUTO DIFF WBC: CPT

## 2024-06-10 PROCEDURE — 2580000003 HC RX 258: Performed by: PHYSICIAN ASSISTANT

## 2024-06-10 PROCEDURE — 74177 CT ABD & PELVIS W/CONTRAST: CPT

## 2024-06-10 PROCEDURE — 87040 BLOOD CULTURE FOR BACTERIA: CPT

## 2024-06-10 PROCEDURE — 6370000000 HC RX 637 (ALT 250 FOR IP): Performed by: PHYSICIAN ASSISTANT

## 2024-06-10 PROCEDURE — 83735 ASSAY OF MAGNESIUM: CPT

## 2024-06-10 PROCEDURE — 81001 URINALYSIS AUTO W/SCOPE: CPT

## 2024-06-10 PROCEDURE — 84145 PROCALCITONIN (PCT): CPT

## 2024-06-10 PROCEDURE — 99285 EMERGENCY DEPT VISIT HI MDM: CPT

## 2024-06-10 PROCEDURE — 96365 THER/PROPH/DIAG IV INF INIT: CPT

## 2024-06-10 PROCEDURE — 6360000002 HC RX W HCPCS: Performed by: PHYSICIAN ASSISTANT

## 2024-06-10 PROCEDURE — 6360000004 HC RX CONTRAST MEDICATION: Performed by: PHYSICIAN ASSISTANT

## 2024-06-10 PROCEDURE — 83690 ASSAY OF LIPASE: CPT

## 2024-06-10 PROCEDURE — 80053 COMPREHEN METABOLIC PANEL: CPT

## 2024-06-10 RX ORDER — MAGNESIUM SULFATE IN WATER 40 MG/ML
2000 INJECTION, SOLUTION INTRAVENOUS PRN
Status: DISCONTINUED | OUTPATIENT
Start: 2024-06-10 | End: 2024-06-15 | Stop reason: HOSPADM

## 2024-06-10 RX ORDER — ONDANSETRON 4 MG/1
4 TABLET, ORALLY DISINTEGRATING ORAL EVERY 8 HOURS PRN
COMMUNITY

## 2024-06-10 RX ORDER — ACETAMINOPHEN 650 MG/1
650 SUPPOSITORY RECTAL EVERY 6 HOURS PRN
Status: DISCONTINUED | OUTPATIENT
Start: 2024-06-10 | End: 2024-06-15 | Stop reason: HOSPADM

## 2024-06-10 RX ORDER — DOCUSATE SODIUM 100 MG/1
100 CAPSULE, LIQUID FILLED ORAL DAILY
COMMUNITY

## 2024-06-10 RX ORDER — ONDANSETRON 2 MG/ML
4 INJECTION INTRAMUSCULAR; INTRAVENOUS EVERY 6 HOURS PRN
Status: DISCONTINUED | OUTPATIENT
Start: 2024-06-10 | End: 2024-06-15 | Stop reason: HOSPADM

## 2024-06-10 RX ORDER — POTASSIUM CHLORIDE 7.45 MG/ML
10 INJECTION INTRAVENOUS PRN
Status: DISCONTINUED | OUTPATIENT
Start: 2024-06-10 | End: 2024-06-15 | Stop reason: HOSPADM

## 2024-06-10 RX ORDER — SODIUM CHLORIDE 0.9 % (FLUSH) 0.9 %
5-40 SYRINGE (ML) INJECTION PRN
Status: DISCONTINUED | OUTPATIENT
Start: 2024-06-10 | End: 2024-06-15 | Stop reason: HOSPADM

## 2024-06-10 RX ORDER — LACTOBACILLUS RHAMNOSUS GG 10B CELL
1 CAPSULE ORAL 2 TIMES DAILY WITH MEALS
Status: DISCONTINUED | OUTPATIENT
Start: 2024-06-11 | End: 2024-06-15 | Stop reason: HOSPADM

## 2024-06-10 RX ORDER — 0.9 % SODIUM CHLORIDE 0.9 %
1000 INTRAVENOUS SOLUTION INTRAVENOUS ONCE
Status: COMPLETED | OUTPATIENT
Start: 2024-06-10 | End: 2024-06-10

## 2024-06-10 RX ORDER — FENOFIBRATE 160 MG/1
160 TABLET ORAL
Status: DISCONTINUED | OUTPATIENT
Start: 2024-06-11 | End: 2024-06-15 | Stop reason: HOSPADM

## 2024-06-10 RX ORDER — MORPHINE SULFATE 4 MG/ML
4 INJECTION, SOLUTION INTRAMUSCULAR; INTRAVENOUS ONCE
Status: COMPLETED | OUTPATIENT
Start: 2024-06-10 | End: 2024-06-10

## 2024-06-10 RX ORDER — BUSPIRONE HYDROCHLORIDE 5 MG/1
7.5 TABLET ORAL 3 TIMES DAILY
Status: DISCONTINUED | OUTPATIENT
Start: 2024-06-10 | End: 2024-06-15 | Stop reason: HOSPADM

## 2024-06-10 RX ORDER — CETIRIZINE HYDROCHLORIDE 10 MG/1
10 TABLET ORAL DAILY
Status: DISCONTINUED | OUTPATIENT
Start: 2024-06-11 | End: 2024-06-15 | Stop reason: HOSPADM

## 2024-06-10 RX ORDER — SENNOSIDES A AND B 8.6 MG/1
1 TABLET, FILM COATED ORAL DAILY PRN
Status: DISCONTINUED | OUTPATIENT
Start: 2024-06-10 | End: 2024-06-15 | Stop reason: HOSPADM

## 2024-06-10 RX ORDER — PANTOPRAZOLE SODIUM 40 MG/1
40 TABLET, DELAYED RELEASE ORAL
Status: DISCONTINUED | OUTPATIENT
Start: 2024-06-11 | End: 2024-06-15 | Stop reason: HOSPADM

## 2024-06-10 RX ORDER — POTASSIUM CHLORIDE 20 MEQ/1
40 TABLET, EXTENDED RELEASE ORAL PRN
Status: DISCONTINUED | OUTPATIENT
Start: 2024-06-10 | End: 2024-06-15 | Stop reason: HOSPADM

## 2024-06-10 RX ORDER — MONTELUKAST SODIUM 10 MG/1
10 TABLET ORAL DAILY
Status: DISCONTINUED | OUTPATIENT
Start: 2024-06-11 | End: 2024-06-15 | Stop reason: HOSPADM

## 2024-06-10 RX ORDER — SODIUM CHLORIDE 0.9 % (FLUSH) 0.9 %
5-40 SYRINGE (ML) INJECTION EVERY 12 HOURS SCHEDULED
Status: DISCONTINUED | OUTPATIENT
Start: 2024-06-10 | End: 2024-06-15 | Stop reason: HOSPADM

## 2024-06-10 RX ORDER — ENOXAPARIN SODIUM 100 MG/ML
40 INJECTION SUBCUTANEOUS DAILY
Status: DISCONTINUED | OUTPATIENT
Start: 2024-06-11 | End: 2024-06-15 | Stop reason: HOSPADM

## 2024-06-10 RX ORDER — ALBUTEROL SULFATE 90 UG/1
2 AEROSOL, METERED RESPIRATORY (INHALATION) EVERY 4 HOURS PRN
Status: DISCONTINUED | OUTPATIENT
Start: 2024-06-10 | End: 2024-06-15 | Stop reason: HOSPADM

## 2024-06-10 RX ORDER — ATORVASTATIN CALCIUM 10 MG/1
10 TABLET, FILM COATED ORAL NIGHTLY
Status: DISCONTINUED | OUTPATIENT
Start: 2024-06-10 | End: 2024-06-15 | Stop reason: HOSPADM

## 2024-06-10 RX ORDER — SODIUM CHLORIDE 9 MG/ML
INJECTION, SOLUTION INTRAVENOUS PRN
Status: DISCONTINUED | OUTPATIENT
Start: 2024-06-10 | End: 2024-06-15 | Stop reason: HOSPADM

## 2024-06-10 RX ORDER — ONDANSETRON 2 MG/ML
4 INJECTION INTRAMUSCULAR; INTRAVENOUS ONCE
Status: COMPLETED | OUTPATIENT
Start: 2024-06-10 | End: 2024-06-10

## 2024-06-10 RX ORDER — NICOTINE 21 MG/24HR
1 PATCH, TRANSDERMAL 24 HOURS TRANSDERMAL DAILY PRN
Status: DISCONTINUED | OUTPATIENT
Start: 2024-06-10 | End: 2024-06-15 | Stop reason: HOSPADM

## 2024-06-10 RX ORDER — DOCUSATE SODIUM 100 MG/1
100 CAPSULE, LIQUID FILLED ORAL DAILY
Status: DISCONTINUED | OUTPATIENT
Start: 2024-06-11 | End: 2024-06-11

## 2024-06-10 RX ORDER — ALBUTEROL SULFATE 90 UG/1
1-2 AEROSOL, METERED RESPIRATORY (INHALATION) EVERY 4 HOURS PRN
COMMUNITY

## 2024-06-10 RX ORDER — FOLIC ACID 1 MG/1
1 TABLET ORAL DAILY
Status: DISCONTINUED | OUTPATIENT
Start: 2024-06-11 | End: 2024-06-15 | Stop reason: HOSPADM

## 2024-06-10 RX ORDER — ACETAMINOPHEN 325 MG/1
650 TABLET ORAL EVERY 6 HOURS PRN
Status: DISCONTINUED | OUTPATIENT
Start: 2024-06-10 | End: 2024-06-15 | Stop reason: HOSPADM

## 2024-06-10 RX ORDER — IBUPROFEN 800 MG/1
800 TABLET ORAL EVERY 8 HOURS PRN
COMMUNITY

## 2024-06-10 RX ORDER — HYDROMORPHONE HYDROCHLORIDE 1 MG/ML
0.5 INJECTION, SOLUTION INTRAMUSCULAR; INTRAVENOUS; SUBCUTANEOUS
Status: DISCONTINUED | OUTPATIENT
Start: 2024-06-10 | End: 2024-06-15 | Stop reason: HOSPADM

## 2024-06-10 RX ORDER — NICOTINE 21 MG/24HR
1 PATCH, TRANSDERMAL 24 HOURS TRANSDERMAL EVERY 24 HOURS
COMMUNITY

## 2024-06-10 RX ORDER — HYDROMORPHONE HYDROCHLORIDE 1 MG/ML
1 INJECTION, SOLUTION INTRAMUSCULAR; INTRAVENOUS; SUBCUTANEOUS
Status: DISCONTINUED | OUTPATIENT
Start: 2024-06-10 | End: 2024-06-15 | Stop reason: HOSPADM

## 2024-06-10 RX ORDER — OMEPRAZOLE 40 MG/1
40 CAPSULE, DELAYED RELEASE ORAL DAILY
COMMUNITY

## 2024-06-10 RX ADMIN — ATORVASTATIN CALCIUM 10 MG: 10 TABLET, FILM COATED ORAL at 21:35

## 2024-06-10 RX ADMIN — SODIUM CHLORIDE, PRESERVATIVE FREE 10 ML: 5 INJECTION INTRAVENOUS at 21:36

## 2024-06-10 RX ADMIN — PIPERACILLIN SODIUM AND TAZOBACTAM SODIUM 3375 MG: 3; .375 INJECTION, POWDER, LYOPHILIZED, FOR SOLUTION INTRAVENOUS at 19:16

## 2024-06-10 RX ADMIN — ONDANSETRON 4 MG: 2 INJECTION INTRAMUSCULAR; INTRAVENOUS at 21:46

## 2024-06-10 RX ADMIN — BUSPIRONE HYDROCHLORIDE 7.5 MG: 5 TABLET ORAL at 21:35

## 2024-06-10 RX ADMIN — IOPAMIDOL 75 ML: 755 INJECTION, SOLUTION INTRAVENOUS at 17:15

## 2024-06-10 RX ADMIN — MORPHINE SULFATE 4 MG: 4 INJECTION, SOLUTION INTRAMUSCULAR; INTRAVENOUS at 16:46

## 2024-06-10 RX ADMIN — SODIUM CHLORIDE 1000 ML: 9 INJECTION, SOLUTION INTRAVENOUS at 19:24

## 2024-06-10 RX ADMIN — ONDANSETRON 4 MG: 2 INJECTION INTRAMUSCULAR; INTRAVENOUS at 16:45

## 2024-06-10 RX ADMIN — HYDROMORPHONE HYDROCHLORIDE 1 MG: 1 INJECTION, SOLUTION INTRAMUSCULAR; INTRAVENOUS; SUBCUTANEOUS at 21:26

## 2024-06-10 RX ADMIN — SODIUM CHLORIDE 1000 ML: 9 INJECTION, SOLUTION INTRAVENOUS at 16:44

## 2024-06-10 ASSESSMENT — PAIN SCALES - GENERAL
PAINLEVEL_OUTOF10: 8
PAINLEVEL_OUTOF10: 5
PAINLEVEL_OUTOF10: 9

## 2024-06-10 ASSESSMENT — PAIN - FUNCTIONAL ASSESSMENT
PAIN_FUNCTIONAL_ASSESSMENT: ACTIVITIES ARE NOT PREVENTED
PAIN_FUNCTIONAL_ASSESSMENT: 0-10

## 2024-06-10 ASSESSMENT — PAIN DESCRIPTION - DESCRIPTORS: DESCRIPTORS: STABBING;SHOOTING;BURNING

## 2024-06-10 ASSESSMENT — LIFESTYLE VARIABLES
HOW MANY STANDARD DRINKS CONTAINING ALCOHOL DO YOU HAVE ON A TYPICAL DAY: 1 OR 2
HOW OFTEN DO YOU HAVE A DRINK CONTAINING ALCOHOL: 2-4 TIMES A MONTH

## 2024-06-10 ASSESSMENT — PAIN DESCRIPTION - LOCATION
LOCATION: ABDOMEN
LOCATION: ABDOMEN

## 2024-06-10 ASSESSMENT — ENCOUNTER SYMPTOMS
SHORTNESS OF BREATH: 0
DIARRHEA: 0
ABDOMINAL PAIN: 1
NAUSEA: 1
VOMITING: 1
CHEST TIGHTNESS: 0

## 2024-06-10 ASSESSMENT — PAIN DESCRIPTION - PAIN TYPE
TYPE: ACUTE PAIN
TYPE: SURGICAL PAIN

## 2024-06-10 ASSESSMENT — PAIN DESCRIPTION - DIRECTION: RADIATING_TOWARDS: BACK

## 2024-06-10 ASSESSMENT — PAIN DESCRIPTION - ORIENTATION: ORIENTATION: RIGHT

## 2024-06-10 NOTE — ED PROVIDER NOTES
temperature is 100.1 °F on arrival.  She has no urinary or vaginal complaints stating she is postmenopausal.  She denies chest pain or shortness of breath.    Patient has well-healing incisions from recent laparoscopic ventral hernia repair.  She does have somewhat generalized abdominal tenderness on palpation.  There is no rigidity or guarding.  Abdomen is soft with bowel sounds present in all 4 quadrants.    CBC with a mild leukocytosis of 11.4 without profound anemia.  Lactate is normal.  BMP, LFTs, lipase and magnesium are unremarkable.  Urine analysis with signs of possible infection.  This will be sent for culture.    CT abdomen and pelvis completed showing postoperative collection involving the epigastric soft tissues of the abdomen.  Small associated bubbles of air.  Small additional collection anterior to the peritoneum in the anterior aspect of the abdomen which could represent postoperative seromas.  Patchy infiltrate in the posterior aspect of the left lower lobe.  3 mm noncalcified nodule in the right lower lobe which is new compared to the prior exam.  Mild mural thickening of the distal esophagus may be related to esophagitis.    Patient given a 1 L fluid bolus.  She is given pain and nausea medication.  She is started on Zosyn.    I did consult general surgery who recommends admission for serial abdominal exams.     Patient is given an additional 1 L fluid bolus      Disposition Considerations (include 1 Tests not done, Shared Decision Making, Pt Expectation of Test or Tx.): We had a lengthy discussion with the patient regarding testing completed in the emergency department today as well as the results.  Patient understands she will require admission for further evaluation and treatment.  She is agreeable with this.  Hospitalist is consulted at 7:12 PM.      Admission      I am the Primary Clinician of Record.    FINAL IMPRESSION      1. Postoperative abdominal pain    2. Acute cystitis without  hematuria    3. Lung nodule          DISPOSITION/PLAN     DISPOSITION Decision To Admit 06/10/2024 06:34:35 PM      PATIENT REFERRED TO:  No follow-up provider specified.    DISCHARGE MEDICATIONS:  New Prescriptions    No medications on file       DISCONTINUED MEDICATIONS:  Discontinued Medications    CHOLECALCIFEROL (VITAMIN D3) 1.25 MG (70382 UT) CAPS    Take by mouth              (Please note that portions of this note were completed with a voice recognition program.  Efforts were made to edit the dictations but occasionally words are mis-transcribed.)    Danny Hassan PA-C (electronically signed)           Danny Hassan PA-C  06/10/24 [id number]

## 2024-06-10 NOTE — TELEPHONE ENCOUNTER
As we do not have a surgeon in the office today that can examine her,patient advised to go to ED it persist

## 2024-06-10 NOTE — ED PROVIDER NOTES
In addition to the advanced practice provider, I personally saw Gloria E Yonny and performed a substantive portion of the visit including all aspects of the medical decision making.    Medical Decision Making  54-year-old female comes in today with abdominal pain after having a ventral hernia repair by Dr. Armijo last Thursday.  Patient did have a temperature at home of 100.1.  The pain in her abdomen is sharp at a level 9/10.  Patient denies nausea and vomiting.  White blood cell count is 11.4.  Urine does show evidence of UTI.  CAT scan does show fluid collection anterior to the peritoneum in the anterior aspect of the abdomen.  Patient is going to be admitted to the hospital for further evaluation.  General surgery was aware and is okay with the plan.  I agree with the assessment and plan.  Condition is guarded.        SEP-1  Is this patient to be included in the SEP-1 Core Measure due to severe sepsis or septic shock?   No    Screenings     Downey Coma Scale  Eye Opening: Spontaneous  Best Verbal Response: Oriented  Best Motor Response: Obeys commands  Downey Coma Scale Score: 15             Patient Referrals:  No follow-up provider specified.    Discharge Medications:  New Prescriptions    No medications on file       FINAL IMPRESSION  1. Postoperative abdominal pain    2. Acute cystitis without hematuria    3. Lung nodule        Blood pressure (!) 101/57, pulse 81, temperature 99.1 °F (37.3 °C), temperature source Oral, resp. rate 16, height 1.651 m (5' 5\"), weight 94.8 kg (209 lb), last menstrual period 08/01/2017, SpO2 96 %.     I appreciate the TONE's collaboration on this case.    For further details of Gloria TRENTON Yonny's emergency department encounter, please see documentation by advanced practice provider, Danny Hassan.        Rhett Corbin DO  06/10/24 0633

## 2024-06-10 NOTE — PROGRESS NOTES
Pharmacy Home Medication Reconciliation Note    A medication reconciliation has been completed for Gloria Blancas 1970    Pharmacy: [address]  Information provided by: patient    The patient's home medication list is as follows:  No current facility-administered medications on file prior to encounter.     Current Outpatient Medications on File Prior to Encounter   Medication Sig Dispense Refill    nicotine (NICODERM CQ) 21 MG/24HR Place 1 patch onto the skin every 24 hours      albuterol sulfate HFA (PROVENTIL;VENTOLIN;PROAIR) 108 (90 Base) MCG/ACT inhaler Inhale 1-2 puffs into the lungs every 4 hours as needed for Wheezing or Shortness of Breath      docusate sodium (COLACE) 100 MG capsule Take 1 capsule by mouth daily      ibuprofen (ADVIL;MOTRIN) 800 MG tablet Take 1 tablet by mouth every 8 hours as needed for Pain      ondansetron (ZOFRAN-ODT) 4 MG disintegrating tablet Take 1 tablet by mouth every 8 hours as needed for Nausea or Vomiting      omeprazole (PRILOSEC) 40 MG delayed release capsule Take 1 capsule by mouth daily      oxyCODONE-acetaminophen (PERCOCET) 7.5-325 MG per tablet Take 1-2 tablets by mouth every 4 hours as needed for Pain for up to 30 days. Intended supply: 30 days Max Daily Amount: 12 tablets 20 tablet 0    [] HYDROcodone-acetaminophen (NORCO) 5-325 MG per tablet Take 1-2 tablets by mouth every 4 hours as needed for Pain for up to 3 days. Intended supply: 3 days. Take lowest dose possible to manage pain Max Daily Amount: 12 tablets 20 tablet 0    MOUNJARO 12.5 MG/0.5ML SOPN SC injection Inject 0.5 mLs into the skin once a week .      vitamin D (ERGOCALCIFEROL) 1.25 MG (51642 UT) CAPS capsule Take 1 capsule by mouth once a week .      busPIRone (BUSPAR) 7.5 MG tablet Take 1 tablet by mouth 3 times daily      omeprazole (PRILOSEC OTC) 20 MG tablet Take 1 tablet by mouth daily (Patient not taking: Reported on 6/10/2024)

## 2024-06-11 LAB
ALBUMIN SERPL-MCNC: 3.3 G/DL (ref 3.4–5)
ALBUMIN/GLOB SERPL: 1 {RATIO} (ref 1.1–2.2)
ALP SERPL-CCNC: 66 U/L (ref 40–129)
ALT SERPL-CCNC: 14 U/L (ref 10–40)
ANION GAP SERPL CALCULATED.3IONS-SCNC: 8 MMOL/L (ref 3–16)
APTT BLD: 31.3 SEC (ref 22.1–36.4)
AST SERPL-CCNC: 15 U/L (ref 15–37)
BACTERIA UR CULT: NORMAL
BASOPHILS # BLD: 0.1 K/UL (ref 0–0.2)
BASOPHILS NFR BLD: 0.6 %
BILIRUB SERPL-MCNC: <0.2 MG/DL (ref 0–1)
BUN SERPL-MCNC: 10 MG/DL (ref 7–20)
CALCIUM SERPL-MCNC: 8.5 MG/DL (ref 8.3–10.6)
CHLORIDE SERPL-SCNC: 103 MMOL/L (ref 99–110)
CO2 SERPL-SCNC: 28 MMOL/L (ref 21–32)
CREAT SERPL-MCNC: 0.8 MG/DL (ref 0.6–1.1)
DEPRECATED RDW RBC AUTO: 14.8 % (ref 12.4–15.4)
EOSINOPHIL # BLD: 0.4 K/UL (ref 0–0.6)
EOSINOPHIL NFR BLD: 4.7 %
GFR SERPLBLD CREATININE-BSD FMLA CKD-EPI: 88 ML/MIN/{1.73_M2}
GLUCOSE SERPL-MCNC: 146 MG/DL (ref 70–99)
HCT VFR BLD AUTO: 31.9 % (ref 36–48)
HGB BLD-MCNC: 10.6 G/DL (ref 12–16)
INR PPP: 1.02 (ref 0.85–1.15)
LACTATE BLDV-SCNC: 1 MMOL/L (ref 0.4–2)
LYMPHOCYTES # BLD: 2.6 K/UL (ref 1–5.1)
LYMPHOCYTES NFR BLD: 31.4 %
MAGNESIUM SERPL-MCNC: 2 MG/DL (ref 1.8–2.4)
MCH RBC QN AUTO: 28.5 PG (ref 26–34)
MCHC RBC AUTO-ENTMCNC: 33.2 G/DL (ref 31–36)
MCV RBC AUTO: 85.9 FL (ref 80–100)
MONOCYTES # BLD: 0.4 K/UL (ref 0–1.3)
MONOCYTES NFR BLD: 5.1 %
NEUTROPHILS # BLD: 4.9 K/UL (ref 1.7–7.7)
NEUTROPHILS NFR BLD: 58.2 %
PHOSPHATE SERPL-MCNC: 3.5 MG/DL (ref 2.5–4.9)
PLATELET # BLD AUTO: 263 K/UL (ref 135–450)
PMV BLD AUTO: 7.8 FL (ref 5–10.5)
POTASSIUM SERPL-SCNC: 3.7 MMOL/L (ref 3.5–5.1)
PREALB SERPL-MCNC: 10.4 MG/DL (ref 20–40)
PROT SERPL-MCNC: 6.5 G/DL (ref 6.4–8.2)
PROTHROMBIN TIME: 13.6 SEC (ref 11.9–14.9)
RBC # BLD AUTO: 3.71 M/UL (ref 4–5.2)
SODIUM SERPL-SCNC: 139 MMOL/L (ref 136–145)
TRANSFERRIN SERPL-MCNC: 195 MG/DL (ref 200–360)
WBC # BLD AUTO: 8.4 K/UL (ref 4–11)

## 2024-06-11 PROCEDURE — 84466 ASSAY OF TRANSFERRIN: CPT

## 2024-06-11 PROCEDURE — 80053 COMPREHEN METABOLIC PANEL: CPT

## 2024-06-11 PROCEDURE — 6370000000 HC RX 637 (ALT 250 FOR IP): Performed by: PHYSICIAN ASSISTANT

## 2024-06-11 PROCEDURE — 85730 THROMBOPLASTIN TIME PARTIAL: CPT

## 2024-06-11 PROCEDURE — 1200000000 HC SEMI PRIVATE

## 2024-06-11 PROCEDURE — 83605 ASSAY OF LACTIC ACID: CPT

## 2024-06-11 PROCEDURE — 85610 PROTHROMBIN TIME: CPT

## 2024-06-11 PROCEDURE — 84134 ASSAY OF PREALBUMIN: CPT

## 2024-06-11 PROCEDURE — 6360000002 HC RX W HCPCS: Performed by: PHYSICIAN ASSISTANT

## 2024-06-11 PROCEDURE — 36415 COLL VENOUS BLD VENIPUNCTURE: CPT

## 2024-06-11 PROCEDURE — 85025 COMPLETE CBC W/AUTO DIFF WBC: CPT

## 2024-06-11 PROCEDURE — APPNB30 APP NON BILLABLE TIME 0-30 MINS: Performed by: NURSE PRACTITIONER

## 2024-06-11 PROCEDURE — 6370000000 HC RX 637 (ALT 250 FOR IP): Performed by: NURSE PRACTITIONER

## 2024-06-11 PROCEDURE — 2580000003 HC RX 258: Performed by: PHYSICIAN ASSISTANT

## 2024-06-11 PROCEDURE — 83735 ASSAY OF MAGNESIUM: CPT

## 2024-06-11 PROCEDURE — 99024 POSTOP FOLLOW-UP VISIT: CPT | Performed by: SURGERY

## 2024-06-11 PROCEDURE — APPSS45 APP SPLIT SHARED TIME 31-45 MINUTES: Performed by: NURSE PRACTITIONER

## 2024-06-11 PROCEDURE — 84100 ASSAY OF PHOSPHORUS: CPT

## 2024-06-11 RX ORDER — MAGNESIUM CARB/ALUMINUM HYDROX 105-160MG
30 TABLET,CHEWABLE ORAL ONCE
Status: COMPLETED | OUTPATIENT
Start: 2024-06-11 | End: 2024-06-11

## 2024-06-11 RX ORDER — DOCUSATE SODIUM 100 MG/1
100 CAPSULE, LIQUID FILLED ORAL 2 TIMES DAILY
Status: DISCONTINUED | OUTPATIENT
Start: 2024-06-11 | End: 2024-06-13 | Stop reason: SDUPTHER

## 2024-06-11 RX ORDER — POLYETHYLENE GLYCOL 3350 17 G/17G
17 POWDER, FOR SOLUTION ORAL 2 TIMES DAILY
Status: DISCONTINUED | OUTPATIENT
Start: 2024-06-11 | End: 2024-06-15 | Stop reason: HOSPADM

## 2024-06-11 RX ADMIN — ONDANSETRON 4 MG: 2 INJECTION INTRAMUSCULAR; INTRAVENOUS at 06:46

## 2024-06-11 RX ADMIN — PIPERACILLIN AND TAZOBACTAM 3375 MG: 3; .375 INJECTION, POWDER, LYOPHILIZED, FOR SOLUTION INTRAVENOUS at 18:01

## 2024-06-11 RX ADMIN — HYDROMORPHONE HYDROCHLORIDE 1 MG: 1 INJECTION, SOLUTION INTRAMUSCULAR; INTRAVENOUS; SUBCUTANEOUS at 11:49

## 2024-06-11 RX ADMIN — HYDROMORPHONE HYDROCHLORIDE 1 MG: 1 INJECTION, SOLUTION INTRAMUSCULAR; INTRAVENOUS; SUBCUTANEOUS at 15:31

## 2024-06-11 RX ADMIN — PIPERACILLIN AND TAZOBACTAM 3375 MG: 3; .375 INJECTION, POWDER, LYOPHILIZED, FOR SOLUTION INTRAVENOUS at 00:55

## 2024-06-11 RX ADMIN — HYDROMORPHONE HYDROCHLORIDE 1 MG: 1 INJECTION, SOLUTION INTRAMUSCULAR; INTRAVENOUS; SUBCUTANEOUS at 18:28

## 2024-06-11 RX ADMIN — SODIUM CHLORIDE, PRESERVATIVE FREE 10 ML: 5 INJECTION INTRAVENOUS at 08:39

## 2024-06-11 RX ADMIN — BUSPIRONE HYDROCHLORIDE 7.5 MG: 5 TABLET ORAL at 08:39

## 2024-06-11 RX ADMIN — ATORVASTATIN CALCIUM 10 MG: 10 TABLET, FILM COATED ORAL at 21:25

## 2024-06-11 RX ADMIN — MINERAL OIL 30 ML: 1000 SOLUTION ORAL at 11:04

## 2024-06-11 RX ADMIN — CETIRIZINE HYDROCHLORIDE 10 MG: 10 TABLET, FILM COATED ORAL at 08:39

## 2024-06-11 RX ADMIN — MONTELUKAST SODIUM 10 MG: 10 TABLET, FILM COATED ORAL at 08:39

## 2024-06-11 RX ADMIN — Medication 1 CAPSULE: at 17:58

## 2024-06-11 RX ADMIN — PIPERACILLIN AND TAZOBACTAM 3375 MG: 3; .375 INJECTION, POWDER, LYOPHILIZED, FOR SOLUTION INTRAVENOUS at 08:45

## 2024-06-11 RX ADMIN — BUSPIRONE HYDROCHLORIDE 7.5 MG: 5 TABLET ORAL at 21:25

## 2024-06-11 RX ADMIN — FOLIC ACID 1 MG: 1 TABLET ORAL at 08:38

## 2024-06-11 RX ADMIN — Medication 1 CAPSULE: at 08:39

## 2024-06-11 RX ADMIN — DOCUSATE SODIUM 100 MG: 100 CAPSULE, LIQUID FILLED ORAL at 08:39

## 2024-06-11 RX ADMIN — BUSPIRONE HYDROCHLORIDE 7.5 MG: 5 TABLET ORAL at 15:28

## 2024-06-11 RX ADMIN — HYDROMORPHONE HYDROCHLORIDE 1 MG: 1 INJECTION, SOLUTION INTRAMUSCULAR; INTRAVENOUS; SUBCUTANEOUS at 00:57

## 2024-06-11 RX ADMIN — SODIUM CHLORIDE: 9 INJECTION, SOLUTION INTRAVENOUS at 00:54

## 2024-06-11 RX ADMIN — POLYETHYLENE GLYCOL 3350 17 G: 17 POWDER, FOR SOLUTION ORAL at 11:03

## 2024-06-11 RX ADMIN — ONDANSETRON 4 MG: 2 INJECTION INTRAMUSCULAR; INTRAVENOUS at 19:57

## 2024-06-11 RX ADMIN — HYDROMORPHONE HYDROCHLORIDE 1 MG: 1 INJECTION, SOLUTION INTRAMUSCULAR; INTRAVENOUS; SUBCUTANEOUS at 04:28

## 2024-06-11 RX ADMIN — HYDROMORPHONE HYDROCHLORIDE 1 MG: 1 INJECTION, SOLUTION INTRAMUSCULAR; INTRAVENOUS; SUBCUTANEOUS at 08:34

## 2024-06-11 RX ADMIN — ONDANSETRON 4 MG: 2 INJECTION INTRAMUSCULAR; INTRAVENOUS at 12:06

## 2024-06-11 RX ADMIN — FENOFIBRATE 160 MG: 160 TABLET ORAL at 06:43

## 2024-06-11 RX ADMIN — SENNOSIDES 8.6 MG: 8.6 TABLET, COATED ORAL at 18:04

## 2024-06-11 RX ADMIN — POLYETHYLENE GLYCOL 3350 17 G: 17 POWDER, FOR SOLUTION ORAL at 21:25

## 2024-06-11 RX ADMIN — ENOXAPARIN SODIUM 40 MG: 100 INJECTION SUBCUTANEOUS at 11:04

## 2024-06-11 RX ADMIN — DOCUSATE SODIUM 100 MG: 100 CAPSULE, LIQUID FILLED ORAL at 21:25

## 2024-06-11 RX ADMIN — PANTOPRAZOLE SODIUM 40 MG: 40 TABLET, DELAYED RELEASE ORAL at 06:43

## 2024-06-11 RX ADMIN — SENNOSIDES 8.6 MG: 8.6 TABLET, COATED ORAL at 01:02

## 2024-06-11 ASSESSMENT — PAIN DESCRIPTION - DESCRIPTORS
DESCRIPTORS: CRAMPING;DISCOMFORT
DESCRIPTORS: DISCOMFORT
DESCRIPTORS: ACHING;DISCOMFORT
DESCRIPTORS: DISCOMFORT
DESCRIPTORS: STABBING;BURNING;THROBBING

## 2024-06-11 ASSESSMENT — PAIN DESCRIPTION - LOCATION
LOCATION: ABDOMEN

## 2024-06-11 ASSESSMENT — PAIN SCALES - GENERAL
PAINLEVEL_OUTOF10: 4
PAINLEVEL_OUTOF10: 8
PAINLEVEL_OUTOF10: 3
PAINLEVEL_OUTOF10: 5
PAINLEVEL_OUTOF10: 8
PAINLEVEL_OUTOF10: 8
PAINLEVEL_OUTOF10: 5
PAINLEVEL_OUTOF10: 8

## 2024-06-11 ASSESSMENT — PAIN DESCRIPTION - ORIENTATION
ORIENTATION: RIGHT;MID
ORIENTATION: MID

## 2024-06-11 NOTE — PROGRESS NOTES
Shift assessment completed. VSS. Alert and oriented x 4. Abdomen distended and taut. Lap sites x 4 to abdomen clean, dry and intact. Medications given per MAR. The care plan and education have been reviewed and mutually agreed upon with the patient. Call light in reach.

## 2024-06-11 NOTE — PLAN OF CARE
Problem: Discharge Planning  Goal: Discharge to home or other facility with appropriate resources  6/11/2024 1047 by Preeti Browne RN  Outcome: Progressing  6/11/2024 0416 by Magdalene Agrawal, RN  Outcome: Progressing     Problem: Pain  Goal: Verbalizes/displays adequate comfort level or baseline comfort level  6/11/2024 1047 by Preeti Browne RN  Outcome: Progressing  6/11/2024 0416 by Magdalene Agrawal, RN  Outcome: Progressing

## 2024-06-11 NOTE — H&P
Hospital Medicine History & Physical        Name:  Gloria Blancas /Age/Sex: 1970  (54 y.o. female)   MRN & CSN:  [id number] & [id number] Encounter Date/Time: 6/10/2024 9:34 PM EDT   Location:  [facility]4476/[facility]76[facility]01 PCP: William Albert MD        n8  CHIEF COMPLAINT:   Chief Complaint   Patient presents with    Abdominal Pain     Arrived via friend d/t increased post op pain from a hernia surg performed by Aleksander; c/o n/v and constipation since Saturday         HISTORY OF PRESENT ILLNESS:      History from: patient  Limitations to history : None     Gloria Blancas is a 54 y.o. female who presented to ED for evaluation of abdominal pain.  Patient reports she had a ventral hernia repair on  with Dr. Armijo and has had ongoing abdominal pain since that time.  She describes pain as sharp, constant, 9/10 pain that localizes to her periumbilical region but radiates to her whole abdomen and into her lower back.  She reports associated nausea and vomiting.  She reports she has not had a bowel movement since surgery despite taking Colace.  She reports she has had chills.  Temperature upon arrival is 100.1.  She denies chest pain, shortness of breath, cough, urinary symptoms.  She denies any other complaints or concerns at this time.      REVIEW OF SYSTEMS:   Pertinent positives as noted in the HPI. All other systems reviewed and negative.      PHYSICAL EXAM PERFORMED:  /70   Pulse 81   Temp 98.6 °F (37 °C) (Oral)   Resp 18   Ht 1.651 m (5' 5\")   Wt 94.8 kg (209 lb)   LMP 2017   SpO2 92%   BMI 34.78 kg/m²     General appearance:  Awake, alert, no apparent distress  HEENT:  Normocephalic, atraumatic without obvious deformity. PERRL. EOM intact. Conjunctivae/corneas clear.  Neck: Supple, with full range of motion. No JVD. Trachea midline.  Respiratory:  Clear to auscultation bilaterally without rales, wheezes, or rhonchi. Normal respiratory effort.   Cardiovascular:   involving the anterior soft tissues of the abdomen. Postsurgical change involving the lumbar spine.  Spondylosis of the lumbar and imaged thoracic spine.  No acute osseous abnormality identified.     Postoperative collection involving the epigastric soft tissues of the abdomen. Small associated bubbles of air. Small additional collection anterior to the peritoneum in the anterior aspect of the abdomen. These may represent postoperative seromas. Superimposed infection cannot be excluded. Patchy infiltrate in the posterior aspect of the left lower lobe. 3 mm noncalcified nodule in the right lower lobe which is new when compared to the prior exam. Mild mural thickening of the distal esophagus may be related to esophagitis. Small volume free fluid in the pelvis. Air seen in the non dependent bladder related to recent instrumentation versus infection.  Correlation with urine laboratory exams may be helpful. Mild constipation. Additional findings noted above. RECOMMENDATIONS: Solid pulmonary nodule measuring 3 mm. Per Fleischner Society Guidelines, no routine follow-up imaging is recommended. These guidelines do not apply to immunocompromised patients and patients with cancer. Follow up in patients with significant comorbidities as clinically warranted. For lung cancer screening, adhere to Lung-RADS guidelines. Reference: Radiology. 2017; 284(1):228-43.         (Please note that portions of this note were completed with a voice recognition program.  Efforts were made to edit the dictations but occasionally words are mis-transcribed.)     Geovanna Avery PA-C

## 2024-06-11 NOTE — CONSULTS
[facility]        Consultation    Patient Name: Gloria Blancas  MRN: [id number]  YOB: 1970  Admission Date: 6/10/2024  3:10 PM   Date of Evaluation: 2024  Primary Care Physician: William Albert MD  Chief Complaint: Abdominal pain    SUBJECTIVE    History of Present Illness:  Ms. Blancas is a 54 y.o. female who is postoperative day 5 status-post robotic laparoscopic repair of 4 cm ventral hernia with mesh with Dr. Armijo, and presented to the ED yesterday with complaints of progressive pain since surgery.  She reports that Vicodin didn't help, and was given a new prescription for Percocet as well as tried 800 mg Ibuprofen, both with limited relief.  Associated low-grade fever, bloating, constipation, and reports nausea and vomiting that developed a couple of nights ago.  No BM since surgery but is passing flatus.  No issues with surgical incisions.  She denies dysuria.  Medical history includes hypertension, hyperlipidemia, and diabetes.      Past Medical History:      Diagnosis Date    Anxiety     Asthma     Dental crown present     Depression     Diabetes mellitus (HCC)     GERD (gastroesophageal reflux disease)     Hyperlipidemia     Hypertension     Migraine     Nausea & vomiting     Obesity     Obstructive sleep apnea     PONV (postoperative nausea and vomiting)     Pre-operative clearance     S/P bariatric surgery 2011       Past Surgical History:      Procedure Laterality Date    ACROMIOPLASTY      L Shoulder    BACK SURGERY      Thoracic    CERVIX SURGERY  1995    Conization      SECTION      COLPOSCOPY      ENDOMETRIAL ABLATION  2009    GASTRIC BAND N/A 2023    ROBOTIC BAND REMOVAL, TAKE DOWN OF GASTRIC ADHESIONS, LAPAROSCOPIC LYSIS OF ADHESIONS performed by Jean Paul Bess DO at [facility] OR    KIDNEY STONE REMOVAL      LAP BAND  2011    LIPOSUCTION      ERNESTO STEROTACTIC LOC BREAST BIOPSY LEFT Left  effusion or pericardial thickening. Organs: No calcified gallstones are seen.  Common bile duct is within normal limits.  Mild diffuse fatty infiltration liver.  Fatty sparing adjacent to the gallbladder.  Spleen, adrenal glands, and pancreas are normal in appearance. No left renal calculus, hydronephrosis, or renal lesion.  No right renal calculus, hydronephrosis, or renal lesion. GI/Bowel: Small volume fecal residuals in the colon.  Appendix is normal in appearance.  No acute inflammatory process identified involving the bowel. No evidence of bowel obstruction.  Mild mural thickening of the distal esophagus. Pelvis: Small volume free fluid in the pelvis.  No free air identified. Calcification involving central uterus may be related to a calcified fibroid. Air seen in the non dependent bladder.  Small fat containing inguinal hernias.  No adenopathy or mass. Peritoneum/Retroperitoneum: No retroperitoneal mass or adenopathy. Bones/Soft Tissues: Atrophy of the left rectus abdominus musculature. Postoperative collection involving the epigastric soft tissues involving the abdomen (series 2, image 90) which measures approximately 5.9 x 2.2 cm. Small associated bubbles of air.  Small additional collection anterior to the peritoneum in the anterior aspect of the abdomen (series 2, image 104) which measures approximately 4.5 x 0.8 cm.  Scarring involving the anterior soft tissues of the abdomen. Postsurgical change involving the lumbar spine.  Spondylosis of the lumbar and imaged thoracic spine.  No acute osseous abnormality identified.     Postoperative collection involving the epigastric soft tissues of the abdomen. Small associated bubbles of air. Small additional collection anterior to the peritoneum in the anterior aspect of the abdomen. These may represent postoperative seromas. Superimposed infection cannot be excluded. Patchy infiltrate in the posterior aspect of the left lower lobe. 3 mm noncalcified nodule in the

## 2024-06-11 NOTE — PROGRESS NOTES
2115: Pt had hernia repair surgery on 6/6. Admit from ED for post op abdominal pain rating 8/10. X4 abdominal lap sites. Bedside table and call light within reach. Admission assessment completed. VSS. Medications given per MAR. The care plan and education has been reviewed and mutually agreed upon with the patient.     0100: Senokot given. Pt feels bloated. Passing gas and belching but not able to have a bowel movement. Stated she feels weak and is also scared to push. She still continues to have nausea and emesis x1 since being on the floor.     0650: Continues to have nausea.     Magdalene Agrawal RN

## 2024-06-11 NOTE — ED NOTES
How does patient ambulate?   [x]Low Fall Risk (ambulates by themselves without support)  []Stand by assist   []Contact Guard   []Front wheel walker  []Wheelchair   []Steady  []Bed bound  []History of Lower Extremity Amputation  []Unknown, did not assess in the emergency department   How does patient take pills?  []Whole with Water  []Crushed in applesauce  []Crushed in pudding  []Other  [x]Unknown no oral medications were given in the ED  Is patient alert?   [x]Alert  []Drowsy but responds to voice  []Doesn't respond to voice but responds to painful stimuli  []Unresponsive  Is patient oriented?   [x]To person  [x]To place  [x]To time  []To situation  []Confused  []Agitated  []Follows commands  If patient is disoriented or from a Skill Nursing Facility has family been notified of admission?   []Yes   [x]No  Patient belongings?   []Cell phone  []Wallet   []Dentures  [x]Clothing  Any specific patient or family belongings/needs/dynamics?     Miscellaneous comments/pending orders?      If there are any additional questions please reach out to the Emergency Department.

## 2024-06-11 NOTE — PROGRESS NOTES
[facility]    6/11/2024 11:06 AM        Name: Gloria Blancas .              Admitted: 6/10/2024  Primary Care Provider: William Albert MD (Tel: [phone])          Subjective:    Patient lying bed still having significant abdominal pain no nausea vomiting has not had a bowel movement today    Reviewed interval ancillary notes    Current Medications  docusate sodium (COLACE) capsule 100 mg, BID  polyethylene glycol (GLYCOLAX) packet 17 g, BID  albuterol sulfate HFA (PROVENTIL;VENTOLIN;PROAIR) 108 (90 Base) MCG/ACT inhaler 2 puff, Q4H PRN  atorvastatin (LIPITOR) tablet 10 mg, Nightly  busPIRone (BUSPAR) tablet 7.5 mg, TID  cetirizine (ZYRTEC) tablet 10 mg, Daily  fenofibrate (TRIGLIDE) tablet 160 mg, QAM AC  folic acid (FOLVITE) tablet 1 mg, Daily  montelukast (SINGULAIR) tablet 10 mg, Daily  nicotine (NICODERM CQ) 21 MG/24HR 1 patch, Daily PRN  pantoprazole (PROTONIX) tablet 40 mg, QAM AC  sodium chloride flush 0.9 % injection 5-40 mL, 2 times per day  sodium chloride flush 0.9 % injection 5-40 mL, PRN  0.9 % sodium chloride infusion, PRN  potassium chloride (KLOR-CON M) extended release tablet 40 mEq, PRN   Or  potassium bicarb-citric acid (EFFER-K) effervescent tablet 40 mEq, PRN   Or  potassium chloride 10 mEq/100 mL IVPB (Peripheral Line), PRN  magnesium sulfate 2000 mg in 50 mL IVPB premix, PRN  ondansetron (ZOFRAN) injection 4 mg, Q6H PRN  senna (SENOKOT) tablet 8.6 mg, Daily PRN  acetaminophen (TYLENOL) tablet 650 mg, Q6H PRN   Or  acetaminophen (TYLENOL) suppository 650 mg, Q6H PRN  piperacillin-tazobactam (ZOSYN) 3,375 mg in sodium chloride 0.9 % 50 mL IVPB (mini-bag), Q8H  enoxaparin (LOVENOX) injection 40 mg, Daily  lactobacillus (CULTURELLE) capsule 1 capsule, BID WC  HYDROmorphone HCl PF (DILAUDID) injection 0.5 mg, Q3H PRN   Or  HYDROmorphone HCl PF (DILAUDID) injection 1 mg, Q3H  volume free fluid in the pelvis.      Air seen in the non dependent bladder related to recent instrumentation   versus infection.  Correlation with urine laboratory exams may be helpful.      Mild constipation.      Additional findings noted above.      RECOMMENDATIONS:   Solid pulmonary nodule measuring 3 mm. Per Fleischner Society Guidelines, no   routine follow-up imaging is recommended.      These guidelines do not apply to immunocompromised patients and patients with   cancer. Follow up in patients with significant comorbidities as clinically   warranted. For lung cancer screening, adhere to Lung-RADS guidelines.   Reference: Radiology. 2017; 284(1):228-43.                Recent imaging reviewed    Problem List  Principal Problem:    Postoperative abdominal pain  Resolved Problems:    * No resolved hospital problems. *       Assessment & Plan:   Post op pain,  Seroma vs infecitn  - iv atbx  - gen surgery consult penidng  - iv diladid  Cbc and bmp in am      Diet: ADULT DIET; Clear Liquid  Code:Full Code        Puma Sanchez MD   6/11/2024 11:06 AM

## 2024-06-11 NOTE — CARE COORDINATION
Case Management Assessment  Initial Evaluation    Date/Time of Evaluation: 6/11/2024 8:31 AM  Assessment Completed by: Reynold Nathan Jr, RN    If patient is discharged prior to next notation, then this note serves as note for discharge by case management.    Patient Name: Gloria Blancas                   YOB: 1970  Diagnosis: Lung nodule [R91.1]  Acute cystitis without hematuria [N30.00]  Postoperative abdominal pain [R10.9, G89.18]                   Date / Time: 6/10/2024  3:10 PM    Patient Admission Status: Inpatient   Readmission Risk (Low < 19, Mod (19-27), High > 27): Readmission Risk Score: 7.9    Current PCP: William Albert MD  PCP verified by CM? (P) Yes    Chart Reviewed: Yes      History Provided by: (P) Patient  Patient Orientation: (P) Alert and Oriented    Patient Cognition: (P) Alert    Hospitalization in the last 30 days (Readmission):  No    If yes, Readmission Assessment in CM Navigator will be completed.    Advance Directives:      Code Status: Full Code   Patient's Primary Decision Maker is: (P) Legal Next of Kin      Discharge Planning:    Patient lives with: (P) Spouse/Significant Other Type of Home: (P) Apartment  Primary Care Giver: (P) Self  Patient Support Systems include: (P) Spouse/Significant Other   Current Financial resources: (P) None  Current community resources: (P) None  Current services prior to admission: (P) None            Current DME:              Type of Home Care services:  (P) None    ADLS  Prior functional level: (P) Independent in ADLs/IADLs  Current functional level: (P) Independent in ADLs/IADLs    PT AM-PAC:   /24  OT AM-PAC:   /24    Family can provide assistance at DC: (P) Yes  Would you like Case Management to discuss the discharge plan with any other family members/significant others, and if so, who? (P) No  Plans to Return to Present Housing: (P) Yes  Other Identified Issues/Barriers to RETURNING to current housing:   Potential

## 2024-06-12 ENCOUNTER — APPOINTMENT (OUTPATIENT)
Dept: CT IMAGING | Age: 54
End: 2024-06-12
Payer: COMMERCIAL

## 2024-06-12 ENCOUNTER — APPOINTMENT (OUTPATIENT)
Dept: GENERAL RADIOLOGY | Age: 54
End: 2024-06-12
Payer: COMMERCIAL

## 2024-06-12 LAB
ANION GAP SERPL CALCULATED.3IONS-SCNC: 9 MMOL/L (ref 3–16)
BASOPHILS # BLD: 0 K/UL (ref 0–0.2)
BASOPHILS NFR BLD: 0.3 %
BUN SERPL-MCNC: 8 MG/DL (ref 7–20)
CALCIUM SERPL-MCNC: 8.5 MG/DL (ref 8.3–10.6)
CHLORIDE SERPL-SCNC: 103 MMOL/L (ref 99–110)
CO2 SERPL-SCNC: 27 MMOL/L (ref 21–32)
CREAT SERPL-MCNC: 0.8 MG/DL (ref 0.6–1.1)
DEPRECATED RDW RBC AUTO: 15 % (ref 12.4–15.4)
EOSINOPHIL # BLD: 0.4 K/UL (ref 0–0.6)
EOSINOPHIL NFR BLD: 4.7 %
GFR SERPLBLD CREATININE-BSD FMLA CKD-EPI: 88 ML/MIN/{1.73_M2}
GLUCOSE SERPL-MCNC: 134 MG/DL (ref 70–99)
HCT VFR BLD AUTO: 31.4 % (ref 36–48)
HGB BLD-MCNC: 10.2 G/DL (ref 12–16)
LYMPHOCYTES # BLD: 2.5 K/UL (ref 1–5.1)
LYMPHOCYTES NFR BLD: 29.7 %
MCH RBC QN AUTO: 27.6 PG (ref 26–34)
MCHC RBC AUTO-ENTMCNC: 32.3 G/DL (ref 31–36)
MCV RBC AUTO: 85.4 FL (ref 80–100)
MONOCYTES # BLD: 0.7 K/UL (ref 0–1.3)
MONOCYTES NFR BLD: 7.7 %
NEUTROPHILS # BLD: 4.9 K/UL (ref 1.7–7.7)
NEUTROPHILS NFR BLD: 57.6 %
PLATELET # BLD AUTO: 287 K/UL (ref 135–450)
PMV BLD AUTO: 7.8 FL (ref 5–10.5)
POTASSIUM SERPL-SCNC: 4 MMOL/L (ref 3.5–5.1)
RBC # BLD AUTO: 3.68 M/UL (ref 4–5.2)
SODIUM SERPL-SCNC: 139 MMOL/L (ref 136–145)
WBC # BLD AUTO: 8.5 K/UL (ref 4–11)

## 2024-06-12 PROCEDURE — APPNB30 APP NON BILLABLE TIME 0-30 MINS: Performed by: NURSE PRACTITIONER

## 2024-06-12 PROCEDURE — 99232 SBSQ HOSP IP/OBS MODERATE 35: CPT | Performed by: SURGERY

## 2024-06-12 PROCEDURE — 80048 BASIC METABOLIC PNL TOTAL CA: CPT

## 2024-06-12 PROCEDURE — 1200000000 HC SEMI PRIVATE

## 2024-06-12 PROCEDURE — APPSS15 APP SPLIT SHARED TIME 0-15 MINUTES: Performed by: NURSE PRACTITIONER

## 2024-06-12 PROCEDURE — 6360000002 HC RX W HCPCS: Performed by: PHYSICIAN ASSISTANT

## 2024-06-12 PROCEDURE — 85025 COMPLETE CBC W/AUTO DIFF WBC: CPT

## 2024-06-12 PROCEDURE — 6370000000 HC RX 637 (ALT 250 FOR IP): Performed by: PHYSICIAN ASSISTANT

## 2024-06-12 PROCEDURE — 2580000003 HC RX 258: Performed by: PHYSICIAN ASSISTANT

## 2024-06-12 PROCEDURE — 36415 COLL VENOUS BLD VENIPUNCTURE: CPT

## 2024-06-12 PROCEDURE — 6360000004 HC RX CONTRAST MEDICATION: Performed by: SURGERY

## 2024-06-12 PROCEDURE — 74018 RADEX ABDOMEN 1 VIEW: CPT

## 2024-06-12 PROCEDURE — 6360000004 HC RX CONTRAST MEDICATION: Performed by: INTERNAL MEDICINE

## 2024-06-12 PROCEDURE — 74177 CT ABD & PELVIS W/CONTRAST: CPT

## 2024-06-12 PROCEDURE — 6370000000 HC RX 637 (ALT 250 FOR IP): Performed by: NURSE PRACTITIONER

## 2024-06-12 RX ORDER — LIDOCAINE HYDROCHLORIDE 20 MG/ML
JELLY TOPICAL ONCE
Status: COMPLETED | OUTPATIENT
Start: 2024-06-12 | End: 2024-06-12

## 2024-06-12 RX ORDER — OXYMETAZOLINE HYDROCHLORIDE 0.05 G/100ML
2 SPRAY NASAL ONCE
Status: COMPLETED | OUTPATIENT
Start: 2024-06-12 | End: 2024-06-12

## 2024-06-12 RX ADMIN — PIPERACILLIN AND TAZOBACTAM 3375 MG: 3; .375 INJECTION, POWDER, LYOPHILIZED, FOR SOLUTION INTRAVENOUS at 19:03

## 2024-06-12 RX ADMIN — OXYMETAZOLINE HYDROCHLORIDE 2 SPRAY: 5 SPRAY NASAL at 13:26

## 2024-06-12 RX ADMIN — LIDOCAINE HYDROCHLORIDE: 20 JELLY TOPICAL at 13:26

## 2024-06-12 RX ADMIN — Medication 1 CAPSULE: at 08:15

## 2024-06-12 RX ADMIN — SODIUM CHLORIDE, PRESERVATIVE FREE 10 ML: 5 INJECTION INTRAVENOUS at 08:16

## 2024-06-12 RX ADMIN — ENOXAPARIN SODIUM 40 MG: 100 INJECTION SUBCUTANEOUS at 11:52

## 2024-06-12 RX ADMIN — PIPERACILLIN AND TAZOBACTAM 3375 MG: 3; .375 INJECTION, POWDER, LYOPHILIZED, FOR SOLUTION INTRAVENOUS at 02:06

## 2024-06-12 RX ADMIN — HYDROMORPHONE HYDROCHLORIDE 0.5 MG: 1 INJECTION, SOLUTION INTRAMUSCULAR; INTRAVENOUS; SUBCUTANEOUS at 03:38

## 2024-06-12 RX ADMIN — ONDANSETRON 4 MG: 2 INJECTION INTRAMUSCULAR; INTRAVENOUS at 12:46

## 2024-06-12 RX ADMIN — BUSPIRONE HYDROCHLORIDE 7.5 MG: 5 TABLET ORAL at 22:38

## 2024-06-12 RX ADMIN — HYDROMORPHONE HYDROCHLORIDE 1 MG: 1 INJECTION, SOLUTION INTRAMUSCULAR; INTRAVENOUS; SUBCUTANEOUS at 15:17

## 2024-06-12 RX ADMIN — BUSPIRONE HYDROCHLORIDE 7.5 MG: 5 TABLET ORAL at 08:15

## 2024-06-12 RX ADMIN — DOCUSATE SODIUM 100 MG: 100 CAPSULE, LIQUID FILLED ORAL at 22:38

## 2024-06-12 RX ADMIN — ONDANSETRON 4 MG: 2 INJECTION INTRAMUSCULAR; INTRAVENOUS at 19:00

## 2024-06-12 RX ADMIN — IOPAMIDOL 75 ML: 755 INJECTION, SOLUTION INTRAVENOUS at 18:08

## 2024-06-12 RX ADMIN — CETIRIZINE HYDROCHLORIDE 10 MG: 10 TABLET, FILM COATED ORAL at 08:15

## 2024-06-12 RX ADMIN — DOCUSATE SODIUM 100 MG: 100 CAPSULE, LIQUID FILLED ORAL at 08:15

## 2024-06-12 RX ADMIN — HYDROMORPHONE HYDROCHLORIDE 0.5 MG: 1 INJECTION, SOLUTION INTRAMUSCULAR; INTRAVENOUS; SUBCUTANEOUS at 08:15

## 2024-06-12 RX ADMIN — POLYETHYLENE GLYCOL 3350 17 G: 17 POWDER, FOR SOLUTION ORAL at 08:15

## 2024-06-12 RX ADMIN — ATORVASTATIN CALCIUM 10 MG: 10 TABLET, FILM COATED ORAL at 22:38

## 2024-06-12 RX ADMIN — FENOFIBRATE 160 MG: 160 TABLET ORAL at 06:38

## 2024-06-12 RX ADMIN — PANTOPRAZOLE SODIUM 40 MG: 40 TABLET, DELAYED RELEASE ORAL at 06:38

## 2024-06-12 RX ADMIN — FOLIC ACID 1 MG: 1 TABLET ORAL at 08:15

## 2024-06-12 RX ADMIN — MONTELUKAST SODIUM 10 MG: 10 TABLET, FILM COATED ORAL at 08:15

## 2024-06-12 RX ADMIN — HYDROMORPHONE HYDROCHLORIDE 0.5 MG: 1 INJECTION, SOLUTION INTRAMUSCULAR; INTRAVENOUS; SUBCUTANEOUS at 22:43

## 2024-06-12 RX ADMIN — PIPERACILLIN AND TAZOBACTAM 3375 MG: 3; .375 INJECTION, POWDER, LYOPHILIZED, FOR SOLUTION INTRAVENOUS at 08:20

## 2024-06-12 RX ADMIN — ONDANSETRON 4 MG: 2 INJECTION INTRAMUSCULAR; INTRAVENOUS at 03:39

## 2024-06-12 RX ADMIN — HYDROMORPHONE HYDROCHLORIDE 1 MG: 1 INJECTION, SOLUTION INTRAMUSCULAR; INTRAVENOUS; SUBCUTANEOUS at 11:52

## 2024-06-12 RX ADMIN — HYDROMORPHONE HYDROCHLORIDE 1 MG: 1 INJECTION, SOLUTION INTRAMUSCULAR; INTRAVENOUS; SUBCUTANEOUS at 18:26

## 2024-06-12 RX ADMIN — DIATRIZOATE MEGLUMINE AND DIATRIZOATE SODIUM 5 ML: 660; 100 LIQUID ORAL; RECTAL at 15:49

## 2024-06-12 ASSESSMENT — PAIN DESCRIPTION - DESCRIPTORS
DESCRIPTORS: BURNING
DESCRIPTORS: STABBING
DESCRIPTORS: DISCOMFORT

## 2024-06-12 ASSESSMENT — PAIN DESCRIPTION - ORIENTATION
ORIENTATION: MID
ORIENTATION: MID;UPPER;RIGHT
ORIENTATION: MID

## 2024-06-12 ASSESSMENT — PAIN SCALES - GENERAL
PAINLEVEL_OUTOF10: 8
PAINLEVEL_OUTOF10: 6
PAINLEVEL_OUTOF10: 9
PAINLEVEL_OUTOF10: 5
PAINLEVEL_OUTOF10: 6
PAINLEVEL_OUTOF10: 9
PAINLEVEL_OUTOF10: 6
PAINLEVEL_OUTOF10: 9
PAINLEVEL_OUTOF10: 9
PAINLEVEL_OUTOF10: 7
PAINLEVEL_OUTOF10: 5

## 2024-06-12 ASSESSMENT — PAIN DESCRIPTION - LOCATION
LOCATION: ABDOMEN

## 2024-06-12 NOTE — PROGRESS NOTES
2000: Assessment complete, VSS, BS active in upper quadrants, hypoactive in lower, pt did have a small hard BM today on day shift, abd still rounded/distended, pt feeling very nauseated at this time, gave zofran, see MAR, discussed care plan, pt mutually agrees, call light and belongings in reach, no other needs expressed at this time.    2125: pt nausea resolving, able to take PO pills, encouraged pt to ambulate to help with bowel motility.    0200: pt eating peanut m&m's when this RN entered the room, states she woke up feeling very hungry, reminded pt she is still on clear liquid diet,     0338: gave dilaudid and zofran for abd pain and nausea.  Juliana Frank, RN

## 2024-06-12 NOTE — PLAN OF CARE
Problem: Discharge Planning  Goal: Discharge to home or other facility with appropriate resources  6/12/2024 1041 by Preeti Browne RN  Outcome: Progressing  6/12/2024 0221 by Juliana Frank, RN  Outcome: Progressing     Problem: Pain  Goal: Verbalizes/displays adequate comfort level or baseline comfort level  6/12/2024 1041 by Preeti Browne RN  Outcome: Progressing  6/12/2024 0221 by Juliana Frank, RN  Outcome: Progressing

## 2024-06-12 NOTE — PROGRESS NOTES
[facility]        Progress Note    Patient Name: Gloria Blancas  MRN: [id number]  YOB: 1970  Date of Evaluation: 2024    Subjective:  No acute events overnight  Pain worse today, radiates through to back  Having nausea, reports dry heaves overnight  Reports passing stool without relief of symptoms, feeling more bloated  Resting in bed at this time    Post-Operative Day #6      Vital Signs:  Patient Vitals for the past 24 hrs:   BP Temp Temp src Pulse Resp SpO2   24 1151 106/66 -- -- -- -- --   24 1143 (!) 95/50 98.3 °F (36.8 °C) Oral 71 16 94 %   24 0800 122/80 99 °F (37.2 °C) Oral 67 16 95 %   24 0345 111/73 98 °F (36.7 °C) Oral 57 17 95 %   24 2000 109/63 -- -- (!) 46 16 100 %   24 1719 118/76 98.2 °F (36.8 °C) Oral 59 -- 94 %   24 1211 105/74 99.3 °F (37.4 °C) Oral 74 -- 95 %      TEMPERATURE HISTORY 24H: Temp (24hrs), Av.6 °F (37 °C), Min:98 °F (36.7 °C), Max:99.3 °F (37.4 °C)    BLOOD PRESSURE HISTORY: Systolic (36hrs), Av , Min:95 , Max:122    Diastolic (36hrs), Av, Min:50, Max:80      Intake/Output:  I/O last 3 completed shifts:  In: 91.1 [I.V.:0.4; IV Piggyback:90.8]  Out: -   No intake/output data recorded.  Drain/tube Output:       Physical Exam:  General: awake, alert, oriented to  person, place, time  Cardiac: regular rate and rhythm   Pulmonary: clear to auscultation bilaterally   Abdomen: soft, incisional and upper abdominal tenderness only, bowel sounds present   Skin/Wound: healing well, no drainage, no erythema, well approximated    Labs:  CBC:    Recent Labs     06/10/24  1545 24  0527 24  0532   WBC 11.4* 8.4 8.5   HGB 11.1* 10.6* 10.2*   HCT 34.2* 31.9* 31.4*    263 287     BMP:    Recent Labs     06/10/24  1545 24  0527 24  0532    139 139   K 4.2 3.7 4.0   CL 98* 103 103   CO2 31 28 27   BUN 10 10 8   CREATININE 0.7 0.8 0.8   GLUCOSE 119*  abscess  Increase activity as able  IVF, monitor and replace electrolytes as needed  Defer management of remainder of medical comorbidities to primary and consulting teams    Bereket Post MD, FACS  6/12/2024  3:36 PM

## 2024-06-12 NOTE — PROGRESS NOTES
[facility]    6/12/2024 11:43 AM        Name: Gloria Blancas .              Admitted: 6/10/2024  Primary Care Provider: William Albert MD (Tel: [phone])          Subjective:    Patient had bowel yesterday but nausea with vomiting this morning feels more distended still having second amount of pain  Reviewed interval ancillary notes    Current Medications  docusate sodium (COLACE) capsule 100 mg, BID  polyethylene glycol (GLYCOLAX) packet 17 g, BID  albuterol sulfate HFA (PROVENTIL;VENTOLIN;PROAIR) 108 (90 Base) MCG/ACT inhaler 2 puff, Q4H PRN  atorvastatin (LIPITOR) tablet 10 mg, Nightly  busPIRone (BUSPAR) tablet 7.5 mg, TID  cetirizine (ZYRTEC) tablet 10 mg, Daily  fenofibrate (TRIGLIDE) tablet 160 mg, QAM AC  folic acid (FOLVITE) tablet 1 mg, Daily  montelukast (SINGULAIR) tablet 10 mg, Daily  nicotine (NICODERM CQ) 21 MG/24HR 1 patch, Daily PRN  pantoprazole (PROTONIX) tablet 40 mg, QAM AC  sodium chloride flush 0.9 % injection 5-40 mL, 2 times per day  sodium chloride flush 0.9 % injection 5-40 mL, PRN  0.9 % sodium chloride infusion, PRN  potassium chloride (KLOR-CON M) extended release tablet 40 mEq, PRN   Or  potassium bicarb-citric acid (EFFER-K) effervescent tablet 40 mEq, PRN   Or  potassium chloride 10 mEq/100 mL IVPB (Peripheral Line), PRN  magnesium sulfate 2000 mg in 50 mL IVPB premix, PRN  ondansetron (ZOFRAN) injection 4 mg, Q6H PRN  senna (SENOKOT) tablet 8.6 mg, Daily PRN  acetaminophen (TYLENOL) tablet 650 mg, Q6H PRN   Or  acetaminophen (TYLENOL) suppository 650 mg, Q6H PRN  piperacillin-tazobactam (ZOSYN) 3,375 mg in sodium chloride 0.9 % 50 mL IVPB (mini-bag), Q8H  enoxaparin (LOVENOX) injection 40 mg, Daily  lactobacillus (CULTURELLE) capsule 1 capsule, BID WC  HYDROmorphone HCl PF (DILAUDID) injection 0.5 mg, Q3H PRN   Or  HYDROmorphone HCl PF (DILAUDID) injection 1 mg, Q3H  the pelvis.      Air seen in the non dependent bladder related to recent instrumentation   versus infection.  Correlation with urine laboratory exams may be helpful.      Mild constipation.      Additional findings noted above.      RECOMMENDATIONS:   Solid pulmonary nodule measuring 3 mm. Per Fleischner Society Guidelines, no   routine follow-up imaging is recommended.      These guidelines do not apply to immunocompromised patients and patients with   cancer. Follow up in patients with significant comorbidities as clinically   warranted. For lung cancer screening, adhere to Lung-RADS guidelines.   Reference: Radiology. 2017; 284(1):228-43.                Recent imaging reviewed    Problem List  Principal Problem:    Postoperative abdominal pain  Resolved Problems:    * No resolved hospital problems. *       Assessment & Plan:   Post op pain,  Seroma vs infecitn  Iv atbx  - uti r/o cutlures negative  Continue bowel regiment  Cbc and bmp in am  Feeling more distended having abdominal pain discussed with surgery will come assess      Diet: Diet NPO Exceptions are: Sips of Water with Meds  Code:Full Code        Puma Sanchez MD   6/12/2024 11:43 AM

## 2024-06-12 NOTE — PROGRESS NOTES
Shift assessment completed. VSS. Alert and oriented x 4. Abdomen remains distended, reports passing gas and having a bowel movement overnight. Educated patient on ambulation and encourage patient to walk in hallway. Medications given per mar. Denies any further needs at this time. Call light in reach.

## 2024-06-13 LAB
ANION GAP SERPL CALCULATED.3IONS-SCNC: 9 MMOL/L (ref 3–16)
BASOPHILS # BLD: 0 K/UL (ref 0–0.2)
BASOPHILS NFR BLD: 0.4 %
BUN SERPL-MCNC: 5 MG/DL (ref 7–20)
CALCIUM SERPL-MCNC: 8.5 MG/DL (ref 8.3–10.6)
CHLORIDE SERPL-SCNC: 103 MMOL/L (ref 99–110)
CO2 SERPL-SCNC: 26 MMOL/L (ref 21–32)
CREAT SERPL-MCNC: 0.9 MG/DL (ref 0.6–1.1)
DEPRECATED RDW RBC AUTO: 14.8 % (ref 12.4–15.4)
EOSINOPHIL # BLD: 0.4 K/UL (ref 0–0.6)
EOSINOPHIL NFR BLD: 3.7 %
GFR SERPLBLD CREATININE-BSD FMLA CKD-EPI: 77 ML/MIN/{1.73_M2}
GLUCOSE SERPL-MCNC: 108 MG/DL (ref 70–99)
HCT VFR BLD AUTO: 31.6 % (ref 36–48)
HGB BLD-MCNC: 10.2 G/DL (ref 12–16)
LYMPHOCYTES # BLD: 2.3 K/UL (ref 1–5.1)
LYMPHOCYTES NFR BLD: 22.5 %
MCH RBC QN AUTO: 27.7 PG (ref 26–34)
MCHC RBC AUTO-ENTMCNC: 32.3 G/DL (ref 31–36)
MCV RBC AUTO: 85.5 FL (ref 80–100)
MONOCYTES # BLD: 0.8 K/UL (ref 0–1.3)
MONOCYTES NFR BLD: 7.7 %
NEUTROPHILS # BLD: 6.8 K/UL (ref 1.7–7.7)
NEUTROPHILS NFR BLD: 65.7 %
PLATELET # BLD AUTO: 300 K/UL (ref 135–450)
PMV BLD AUTO: 7.6 FL (ref 5–10.5)
POTASSIUM SERPL-SCNC: 4 MMOL/L (ref 3.5–5.1)
RBC # BLD AUTO: 3.7 M/UL (ref 4–5.2)
SODIUM SERPL-SCNC: 138 MMOL/L (ref 136–145)
WBC # BLD AUTO: 10.4 K/UL (ref 4–11)

## 2024-06-13 PROCEDURE — 1200000000 HC SEMI PRIVATE

## 2024-06-13 PROCEDURE — 80048 BASIC METABOLIC PNL TOTAL CA: CPT

## 2024-06-13 PROCEDURE — 85025 COMPLETE CBC W/AUTO DIFF WBC: CPT

## 2024-06-13 PROCEDURE — 2580000003 HC RX 258: Performed by: PHYSICIAN ASSISTANT

## 2024-06-13 PROCEDURE — APPSS15 APP SPLIT SHARED TIME 0-15 MINUTES: Performed by: NURSE PRACTITIONER

## 2024-06-13 PROCEDURE — 6360000002 HC RX W HCPCS: Performed by: PHYSICIAN ASSISTANT

## 2024-06-13 PROCEDURE — APPNB30 APP NON BILLABLE TIME 0-30 MINS: Performed by: NURSE PRACTITIONER

## 2024-06-13 PROCEDURE — 6370000000 HC RX 637 (ALT 250 FOR IP): Performed by: PHYSICIAN ASSISTANT

## 2024-06-13 PROCEDURE — 6360000002 HC RX W HCPCS: Performed by: NURSE PRACTITIONER

## 2024-06-13 PROCEDURE — 6370000000 HC RX 637 (ALT 250 FOR IP): Performed by: INTERNAL MEDICINE

## 2024-06-13 PROCEDURE — 99024 POSTOP FOLLOW-UP VISIT: CPT | Performed by: SURGERY

## 2024-06-13 PROCEDURE — 6370000000 HC RX 637 (ALT 250 FOR IP): Performed by: NURSE PRACTITIONER

## 2024-06-13 RX ORDER — METOCLOPRAMIDE HYDROCHLORIDE 5 MG/ML
10 INJECTION INTRAMUSCULAR; INTRAVENOUS EVERY 6 HOURS
Status: COMPLETED | OUTPATIENT
Start: 2024-06-13 | End: 2024-06-14

## 2024-06-13 RX ORDER — KETOROLAC TROMETHAMINE 15 MG/ML
15 INJECTION, SOLUTION INTRAMUSCULAR; INTRAVENOUS EVERY 6 HOURS
Status: COMPLETED | OUTPATIENT
Start: 2024-06-13 | End: 2024-06-14

## 2024-06-13 RX ORDER — LIDOCAINE 4 G/G
1 PATCH TOPICAL DAILY
Status: DISCONTINUED | OUTPATIENT
Start: 2024-06-13 | End: 2024-06-15 | Stop reason: HOSPADM

## 2024-06-13 RX ORDER — DOCUSATE SODIUM 100 MG/1
100 CAPSULE, LIQUID FILLED ORAL DAILY
Status: DISCONTINUED | OUTPATIENT
Start: 2024-06-14 | End: 2024-06-15 | Stop reason: HOSPADM

## 2024-06-13 RX ADMIN — ENOXAPARIN SODIUM 40 MG: 100 INJECTION SUBCUTANEOUS at 09:02

## 2024-06-13 RX ADMIN — PIPERACILLIN AND TAZOBACTAM 3375 MG: 3; .375 INJECTION, POWDER, LYOPHILIZED, FOR SOLUTION INTRAVENOUS at 15:40

## 2024-06-13 RX ADMIN — DOCUSATE SODIUM LIQUID 100 MG: 100 LIQUID ORAL at 09:03

## 2024-06-13 RX ADMIN — PIPERACILLIN AND TAZOBACTAM 3375 MG: 3; .375 INJECTION, POWDER, LYOPHILIZED, FOR SOLUTION INTRAVENOUS at 01:50

## 2024-06-13 RX ADMIN — KETOROLAC TROMETHAMINE 15 MG: 15 INJECTION, SOLUTION INTRAMUSCULAR; INTRAVENOUS at 17:51

## 2024-06-13 RX ADMIN — POLYETHYLENE GLYCOL 3350 17 G: 17 POWDER, FOR SOLUTION ORAL at 09:02

## 2024-06-13 RX ADMIN — BUSPIRONE HYDROCHLORIDE 7.5 MG: 5 TABLET ORAL at 09:02

## 2024-06-13 RX ADMIN — HYDROMORPHONE HYDROCHLORIDE 1 MG: 1 INJECTION, SOLUTION INTRAMUSCULAR; INTRAVENOUS; SUBCUTANEOUS at 09:16

## 2024-06-13 RX ADMIN — KETOROLAC TROMETHAMINE 15 MG: 15 INJECTION, SOLUTION INTRAMUSCULAR; INTRAVENOUS at 11:47

## 2024-06-13 RX ADMIN — SODIUM CHLORIDE, PRESERVATIVE FREE 10 ML: 5 INJECTION INTRAVENOUS at 09:02

## 2024-06-13 RX ADMIN — PANTOPRAZOLE SODIUM 40 MG: 40 TABLET, DELAYED RELEASE ORAL at 05:54

## 2024-06-13 RX ADMIN — BUSPIRONE HYDROCHLORIDE 7.5 MG: 5 TABLET ORAL at 21:49

## 2024-06-13 RX ADMIN — CETIRIZINE HYDROCHLORIDE 10 MG: 10 TABLET, FILM COATED ORAL at 09:02

## 2024-06-13 RX ADMIN — ACETAMINOPHEN 650 MG: 325 TABLET ORAL at 09:22

## 2024-06-13 RX ADMIN — Medication 1 CAPSULE: at 09:02

## 2024-06-13 RX ADMIN — FENOFIBRATE 160 MG: 160 TABLET ORAL at 05:54

## 2024-06-13 RX ADMIN — METOCLOPRAMIDE 10 MG: 5 INJECTION, SOLUTION INTRAMUSCULAR; INTRAVENOUS at 10:39

## 2024-06-13 RX ADMIN — FOLIC ACID 1 MG: 1 TABLET ORAL at 09:02

## 2024-06-13 RX ADMIN — ACETAMINOPHEN 650 MG: 325 TABLET ORAL at 21:49

## 2024-06-13 RX ADMIN — SODIUM CHLORIDE, PRESERVATIVE FREE 10 ML: 5 INJECTION INTRAVENOUS at 01:53

## 2024-06-13 RX ADMIN — HYDROMORPHONE HYDROCHLORIDE 1 MG: 1 INJECTION, SOLUTION INTRAMUSCULAR; INTRAVENOUS; SUBCUTANEOUS at 13:18

## 2024-06-13 RX ADMIN — BUSPIRONE HYDROCHLORIDE 7.5 MG: 5 TABLET ORAL at 13:13

## 2024-06-13 RX ADMIN — MONTELUKAST SODIUM 10 MG: 10 TABLET, FILM COATED ORAL at 09:02

## 2024-06-13 RX ADMIN — HYDROMORPHONE HYDROCHLORIDE 0.5 MG: 1 INJECTION, SOLUTION INTRAMUSCULAR; INTRAVENOUS; SUBCUTANEOUS at 05:53

## 2024-06-13 RX ADMIN — ATORVASTATIN CALCIUM 10 MG: 10 TABLET, FILM COATED ORAL at 21:49

## 2024-06-13 RX ADMIN — METOCLOPRAMIDE 10 MG: 5 INJECTION, SOLUTION INTRAMUSCULAR; INTRAVENOUS at 21:49

## 2024-06-13 RX ADMIN — Medication 1 CAPSULE: at 15:40

## 2024-06-13 RX ADMIN — PIPERACILLIN AND TAZOBACTAM 3375 MG: 3; .375 INJECTION, POWDER, LYOPHILIZED, FOR SOLUTION INTRAVENOUS at 09:07

## 2024-06-13 RX ADMIN — HYDROMORPHONE HYDROCHLORIDE 0.5 MG: 1 INJECTION, SOLUTION INTRAMUSCULAR; INTRAVENOUS; SUBCUTANEOUS at 02:42

## 2024-06-13 RX ADMIN — METOCLOPRAMIDE 10 MG: 5 INJECTION, SOLUTION INTRAMUSCULAR; INTRAVENOUS at 15:40

## 2024-06-13 ASSESSMENT — PAIN SCALES - GENERAL
PAINLEVEL_OUTOF10: 7
PAINLEVEL_OUTOF10: 2
PAINLEVEL_OUTOF10: 8
PAINLEVEL_OUTOF10: 5
PAINLEVEL_OUTOF10: 8
PAINLEVEL_OUTOF10: 8
PAINLEVEL_OUTOF10: 1
PAINLEVEL_OUTOF10: 2

## 2024-06-13 ASSESSMENT — PAIN DESCRIPTION - LOCATION
LOCATION: ABDOMEN
LOCATION: THROAT

## 2024-06-13 ASSESSMENT — PAIN SCALES - WONG BAKER
WONGBAKER_NUMERICALRESPONSE: HURTS A LITTLE BIT
WONGBAKER_NUMERICALRESPONSE: HURTS A LITTLE BIT

## 2024-06-13 ASSESSMENT — PAIN DESCRIPTION - DESCRIPTORS
DESCRIPTORS: ACHING
DESCRIPTORS: ACHING
DESCRIPTORS: BURNING
DESCRIPTORS: ACHING

## 2024-06-13 ASSESSMENT — PAIN DESCRIPTION - ORIENTATION
ORIENTATION: RIGHT;MID
ORIENTATION: MID;RIGHT;LOWER
ORIENTATION: RIGHT;MID;LOWER

## 2024-06-13 NOTE — PROGRESS NOTES
2000: pt resting in bed, discussed POC, pt mutually agrees.    2200: Assessment complete BS hypoactive, pt reports BM this evening, and passing flatus, minimal NG output, verified and flushed, educated pt on purpose of NG, pt voiced understanding encouraged pt to ambulated, states she will ambulate later when NG clamped.    2243: gave dilaudid for upper abd pain radiating to pt back, see MAR. Pt ambulated in the hallway, tolerated well.    0248: pt c/o pain in her throat states feels like tube is pressing on her esophagus, verified placement with air bolus, gave dilaudid for pain, offered ice chips and throat spray for comfort, kept pt HOB >30 degrees.    0554: pt took AM PO meds, 1 pill was stuck in pt throat around NG, gave pt warm water and bite of jello, pt O2 and resp WNL still, does state she is getting some relief after a few minutes, pt states she does not want to take any more PO medications while NG in place.  Juliana Frank, RN

## 2024-06-13 NOTE — PLAN OF CARE
Problem: Discharge Planning  Goal: Discharge to home or other facility with appropriate resources  6/13/2024 0945 by Gloria Lisa RN  Flowsheets (Taken 6/13/2024 0945)  Discharge to home or other facility with appropriate resources:   Identify barriers to discharge with patient and caregiver   Arrange for needed discharge resources and transportation as appropriate     Problem: Pain  Goal: Verbalizes/displays adequate comfort level or baseline comfort level  6/13/2024 0945 by Gloria Lisa RN  Flowsheets (Taken 6/13/2024 0945)  Verbalizes/displays adequate comfort level or baseline comfort level:   Assess pain using appropriate pain scale   Encourage patient to monitor pain and request assistance

## 2024-06-13 NOTE — PROGRESS NOTES
Pt AOX4. VSS. Shift assessment completed. Medications given per order. Crushed. NG has minimal output this morning. No further needs at this time. Call light within reach.     Electronically signed by Gloria Lisa RN on 6/13/2024 at 9:59 AM

## 2024-06-13 NOTE — PROGRESS NOTES
[facility]    6/13/2024 11:58 AM        Name: Gloria Blancas .              Admitted: 6/10/2024  Primary Care Provider: William Albert MD (Tel: [phone])          Subjective:    Patient having some nausea still having abdominal distention having liquid and formed bowel movements no fever chills  Reviewed interval ancillary notes    Current Medications  docusate (COLACE) 50 MG/5ML liquid 100 mg, BID  metoclopramide (REGLAN) injection 10 mg, Q6H  lidocaine 4 % external patch 1 patch, Daily  ketorolac (TORADOL) injection 15 mg, Q6H  phenol 1.4 % mouth spray 1 spray, Q2H PRN  diatrizoate meglumine-sodium (GASTROGRAFIN) 66-10 % solution 5 mL, ONCE PRN  polyethylene glycol (GLYCOLAX) packet 17 g, BID  albuterol sulfate HFA (PROVENTIL;VENTOLIN;PROAIR) 108 (90 Base) MCG/ACT inhaler 2 puff, Q4H PRN  atorvastatin (LIPITOR) tablet 10 mg, Nightly  busPIRone (BUSPAR) tablet 7.5 mg, TID  cetirizine (ZYRTEC) tablet 10 mg, Daily  fenofibrate (TRIGLIDE) tablet 160 mg, QAM AC  folic acid (FOLVITE) tablet 1 mg, Daily  montelukast (SINGULAIR) tablet 10 mg, Daily  nicotine (NICODERM CQ) 21 MG/24HR 1 patch, Daily PRN  pantoprazole (PROTONIX) tablet 40 mg, QAM AC  sodium chloride flush 0.9 % injection 5-40 mL, 2 times per day  sodium chloride flush 0.9 % injection 5-40 mL, PRN  0.9 % sodium chloride infusion, PRN  potassium chloride (KLOR-CON M) extended release tablet 40 mEq, PRN   Or  potassium bicarb-citric acid (EFFER-K) effervescent tablet 40 mEq, PRN   Or  potassium chloride 10 mEq/100 mL IVPB (Peripheral Line), PRN  magnesium sulfate 2000 mg in 50 mL IVPB premix, PRN  ondansetron (ZOFRAN) injection 4 mg, Q6H PRN  senna (SENOKOT) tablet 8.6 mg, Daily PRN  acetaminophen (TYLENOL) tablet 650 mg, Q6H PRN   Or  acetaminophen (TYLENOL) suppository 650 mg, Q6H PRN  piperacillin-tazobactam (ZOSYN) 3,375 mg in sodium chloride 0.9

## 2024-06-13 NOTE — PROGRESS NOTES
narcotics as tolerated, add Lidocaine patch and scheduled Toradol  8. DVT prophylaxis with  Lovenox and SCD's  9. Management of medical comorbid etiologies per primary team and consulting services  10. Disposition: Anticipate discharge home in the next 24-48 hours    EDUCATION:  Educated patient on plan of care and disease process--all questions answered.    Plans discussed with patient and nursing.  Reviewed and discussed with Dr. Armijo.      Signed:  Ivette Juarez, APRN - CNP  6/13/2024 10:58 AM    Doing better today  Low level of concern regarding postop complication or infection  Remove NG tube  Clear liquids  IV Reglan  IV Toradol  Aiming for diet advancement and discharge home tomorrow

## 2024-06-13 NOTE — PROGRESS NOTES
NG removed per order. No complications.       Electronically signed by Gloria Lisa RN on 6/13/2024 at 10:51 AM

## 2024-06-14 LAB
ANION GAP SERPL CALCULATED.3IONS-SCNC: 11 MMOL/L (ref 3–16)
BACTERIA BLD CULT ORG #2: NORMAL
BACTERIA BLD CULT: NORMAL
BASOPHILS # BLD: 0.1 K/UL (ref 0–0.2)
BASOPHILS NFR BLD: 0.6 %
BUN SERPL-MCNC: 6 MG/DL (ref 7–20)
CALCIUM SERPL-MCNC: 8.4 MG/DL (ref 8.3–10.6)
CHLORIDE SERPL-SCNC: 108 MMOL/L (ref 99–110)
CO2 SERPL-SCNC: 24 MMOL/L (ref 21–32)
CREAT SERPL-MCNC: 0.8 MG/DL (ref 0.6–1.1)
DEPRECATED RDW RBC AUTO: 14.7 % (ref 12.4–15.4)
EOSINOPHIL # BLD: 0.3 K/UL (ref 0–0.6)
EOSINOPHIL NFR BLD: 2.6 %
GFR SERPLBLD CREATININE-BSD FMLA CKD-EPI: 88 ML/MIN/{1.73_M2}
GLUCOSE SERPL-MCNC: 95 MG/DL (ref 70–99)
HCT VFR BLD AUTO: 31.9 % (ref 36–48)
HGB BLD-MCNC: 10.4 G/DL (ref 12–16)
LYMPHOCYTES # BLD: 3 K/UL (ref 1–5.1)
LYMPHOCYTES NFR BLD: 25.2 %
MCH RBC QN AUTO: 27.3 PG (ref 26–34)
MCHC RBC AUTO-ENTMCNC: 32.4 G/DL (ref 31–36)
MCV RBC AUTO: 84.4 FL (ref 80–100)
MONOCYTES # BLD: 0.9 K/UL (ref 0–1.3)
MONOCYTES NFR BLD: 7.8 %
NEUTROPHILS # BLD: 7.5 K/UL (ref 1.7–7.7)
NEUTROPHILS NFR BLD: 63.8 %
PLATELET # BLD AUTO: 333 K/UL (ref 135–450)
PMV BLD AUTO: 7.3 FL (ref 5–10.5)
POTASSIUM SERPL-SCNC: 4 MMOL/L (ref 3.5–5.1)
RBC # BLD AUTO: 3.78 M/UL (ref 4–5.2)
SODIUM SERPL-SCNC: 143 MMOL/L (ref 136–145)
WBC # BLD AUTO: 11.7 K/UL (ref 4–11)

## 2024-06-14 PROCEDURE — 6360000002 HC RX W HCPCS: Performed by: PHYSICIAN ASSISTANT

## 2024-06-14 PROCEDURE — 85025 COMPLETE CBC W/AUTO DIFF WBC: CPT

## 2024-06-14 PROCEDURE — 6360000002 HC RX W HCPCS: Performed by: NURSE PRACTITIONER

## 2024-06-14 PROCEDURE — 80048 BASIC METABOLIC PNL TOTAL CA: CPT

## 2024-06-14 PROCEDURE — APPNB30 APP NON BILLABLE TIME 0-30 MINS: Performed by: NURSE PRACTITIONER

## 2024-06-14 PROCEDURE — 6370000000 HC RX 637 (ALT 250 FOR IP): Performed by: PHYSICIAN ASSISTANT

## 2024-06-14 PROCEDURE — APPSS15 APP SPLIT SHARED TIME 0-15 MINUTES: Performed by: NURSE PRACTITIONER

## 2024-06-14 PROCEDURE — 1200000000 HC SEMI PRIVATE

## 2024-06-14 PROCEDURE — 2580000003 HC RX 258: Performed by: PHYSICIAN ASSISTANT

## 2024-06-14 PROCEDURE — 6370000000 HC RX 637 (ALT 250 FOR IP): Performed by: INTERNAL MEDICINE

## 2024-06-14 RX ADMIN — CETIRIZINE HYDROCHLORIDE 10 MG: 10 TABLET, FILM COATED ORAL at 09:10

## 2024-06-14 RX ADMIN — Medication 1 CAPSULE: at 15:23

## 2024-06-14 RX ADMIN — FOLIC ACID 1 MG: 1 TABLET ORAL at 09:10

## 2024-06-14 RX ADMIN — PANTOPRAZOLE SODIUM 40 MG: 40 TABLET, DELAYED RELEASE ORAL at 08:11

## 2024-06-14 RX ADMIN — SODIUM CHLORIDE, PRESERVATIVE FREE 10 ML: 5 INJECTION INTRAVENOUS at 00:57

## 2024-06-14 RX ADMIN — KETOROLAC TROMETHAMINE 15 MG: 15 INJECTION, SOLUTION INTRAMUSCULAR; INTRAVENOUS at 07:09

## 2024-06-14 RX ADMIN — METOCLOPRAMIDE 10 MG: 5 INJECTION, SOLUTION INTRAMUSCULAR; INTRAVENOUS at 09:10

## 2024-06-14 RX ADMIN — MONTELUKAST SODIUM 10 MG: 10 TABLET, FILM COATED ORAL at 09:10

## 2024-06-14 RX ADMIN — METOCLOPRAMIDE 10 MG: 5 INJECTION, SOLUTION INTRAMUSCULAR; INTRAVENOUS at 07:09

## 2024-06-14 RX ADMIN — PIPERACILLIN AND TAZOBACTAM 3375 MG: 3; .375 INJECTION, POWDER, LYOPHILIZED, FOR SOLUTION INTRAVENOUS at 09:09

## 2024-06-14 RX ADMIN — METOCLOPRAMIDE 10 MG: 5 INJECTION, SOLUTION INTRAMUSCULAR; INTRAVENOUS at 15:23

## 2024-06-14 RX ADMIN — PIPERACILLIN AND TAZOBACTAM 3375 MG: 3; .375 INJECTION, POWDER, LYOPHILIZED, FOR SOLUTION INTRAVENOUS at 15:27

## 2024-06-14 RX ADMIN — BUSPIRONE HYDROCHLORIDE 7.5 MG: 5 TABLET ORAL at 20:46

## 2024-06-14 RX ADMIN — KETOROLAC TROMETHAMINE 15 MG: 15 INJECTION, SOLUTION INTRAMUSCULAR; INTRAVENOUS at 00:56

## 2024-06-14 RX ADMIN — BUSPIRONE HYDROCHLORIDE 7.5 MG: 5 TABLET ORAL at 09:10

## 2024-06-14 RX ADMIN — ATORVASTATIN CALCIUM 10 MG: 10 TABLET, FILM COATED ORAL at 20:46

## 2024-06-14 RX ADMIN — PIPERACILLIN AND TAZOBACTAM 3375 MG: 3; .375 INJECTION, POWDER, LYOPHILIZED, FOR SOLUTION INTRAVENOUS at 00:58

## 2024-06-14 RX ADMIN — PIPERACILLIN AND TAZOBACTAM 3375 MG: 3; .375 INJECTION, POWDER, LYOPHILIZED, FOR SOLUTION INTRAVENOUS at 23:25

## 2024-06-14 RX ADMIN — Medication 1 CAPSULE: at 09:10

## 2024-06-14 RX ADMIN — DOCUSATE SODIUM 100 MG: 100 CAPSULE, LIQUID FILLED ORAL at 09:10

## 2024-06-14 RX ADMIN — FENOFIBRATE 160 MG: 160 TABLET ORAL at 08:11

## 2024-06-14 RX ADMIN — BUSPIRONE HYDROCHLORIDE 7.5 MG: 5 TABLET ORAL at 15:23

## 2024-06-14 RX ADMIN — ENOXAPARIN SODIUM 40 MG: 100 INJECTION SUBCUTANEOUS at 09:10

## 2024-06-14 ASSESSMENT — PAIN SCALES - GENERAL
PAINLEVEL_OUTOF10: 5
PAINLEVEL_OUTOF10: 4
PAINLEVEL_OUTOF10: 0

## 2024-06-14 ASSESSMENT — PAIN DESCRIPTION - LOCATION
LOCATION: ABDOMEN
LOCATION: ABDOMEN

## 2024-06-14 ASSESSMENT — PAIN SCALES - WONG BAKER: WONGBAKER_NUMERICALRESPONSE: NO HURT

## 2024-06-14 ASSESSMENT — PAIN DESCRIPTION - ORIENTATION: ORIENTATION: RIGHT

## 2024-06-14 NOTE — PROGRESS NOTES
Shift assessment completed. VSS. AM medications administered as ordered. Alert and oriented. Pt ambulates independently, tolerates well.  The care plan and education has been reviewed and mutually agreed upon with the patient.      Tolerating regular diet well, continues to ambulate around room.

## 2024-06-14 NOTE — PROGRESS NOTES
2130: Assessment complete, VSS, pt shivering, temp elevated 99.4, warm blanket applied, will give tylenol for symptoms, see MAR, pt tolerating clears, states she has had several loose BM's today, starting to look green, pt declines to take miralax and colace d/t loose stools, instructed pt to not flush if she has another stool tonight so this RN can assess and document, BS active pt still having mild upper R abd pain radiating to her back, discussed care plan, pt mutually agrees, call light and belongings in reach, no other needs expressed at this time.    0100: pt did get diaphoretic, blankets removed pt have fan on her now, temp 98.7 now, pt states abd pain is still present but mild, gave scheduled toradol, see MAR.    Juliana Frank, RN

## 2024-06-14 NOTE — PROGRESS NOTES
[facility]        Progress Note    Patient Name: Gloria Blancas  MRN: [id number]  YOB: 1970  Date of Evaluation: 2024    Subjective:  Low-grade fever with chills/sweats overnight, otherwise no acute events  Pain improved, controlled  No further nausea or vomiting since NGT removed, tolerating clear liquids and would like to try solid foods  Passing flatus and stool, bloating improving  Up to chair at this time    Post-Operative Day #8      Vital Signs:  Patient Vitals for the past 24 hrs:   BP Temp Temp src Pulse Resp SpO2   24 0900 127/68 99 °F (37.2 °C) Oral 67 18 94 %   24 0100 118/69 98.7 °F (37.1 °C) Oral 84 16 93 %   24 2130 (!) 168/91 99.4 °F (37.4 °C) Oral 95 16 95 %   24 1348 -- -- -- -- 16 --   24 1318 -- -- -- -- 16 --   24 1151 122/76 99 °F (37.2 °C) Oral 62 16 93 %        TEMPERATURE HISTORY 24H: Temp (24hrs), Av °F (37.2 °C), Min:98.7 °F (37.1 °C), Max:99.4 °F (37.4 °C)    BLOOD PRESSURE HISTORY: Systolic (36hrs), Av , Min:118 , Max:168    Diastolic (36hrs), Av, Min:68, Max:91      Intake/Output:  I/O last 3 completed shifts:  In: 680 [P.O.:680]  Out: 595 [Emesis/NG output:595]  No intake/output data recorded.  Drain/tube Output:       Physical Exam:  General: awake, alert, oriented to  person, place, time  Cardiac: regular rate and rhythm   Pulmonary: clear to auscultation bilaterally   Abdomen: soft, mildly distended verses obesity, mild incisional tenderness only, bowel sounds present   Skin/Wound: healing well, no drainage, no erythema, well approximated    Labs:  CBC:    Recent Labs     24  0532 24  0506 24  0514   WBC 8.5 10.4 11.7*   HGB 10.2* 10.2* 10.4*   HCT 31.4* 31.6* 31.9*    300 333       BMP:    Recent Labs     24  0532 24  0506 24  0514    138 143   K 4.0 4.0 4.0    103 108   CO2 27 26 24   BUN 8 5* 6*   CREATININE 0.8 0.9

## 2024-06-14 NOTE — PROGRESS NOTES
[facility]    6/14/2024 12:20 PM        Name: Gloria Blancas .              Admitted: 6/10/2024  Primary Care Provider: William Albert MD (Tel: [phone])          Subjective:    Patient lying bed no nausea vomiting abdominal pain is improved  Reviewed interval ancillary notes    Current Medications  metoclopramide (REGLAN) injection 10 mg, Q6H  lidocaine 4 % external patch 1 patch, Daily  docusate sodium (COLACE) capsule 100 mg, Daily  phenol 1.4 % mouth spray 1 spray, Q2H PRN  diatrizoate meglumine-sodium (GASTROGRAFIN) 66-10 % solution 5 mL, ONCE PRN  polyethylene glycol (GLYCOLAX) packet 17 g, BID  albuterol sulfate HFA (PROVENTIL;VENTOLIN;PROAIR) 108 (90 Base) MCG/ACT inhaler 2 puff, Q4H PRN  atorvastatin (LIPITOR) tablet 10 mg, Nightly  busPIRone (BUSPAR) tablet 7.5 mg, TID  cetirizine (ZYRTEC) tablet 10 mg, Daily  fenofibrate (TRIGLIDE) tablet 160 mg, QAM AC  folic acid (FOLVITE) tablet 1 mg, Daily  montelukast (SINGULAIR) tablet 10 mg, Daily  nicotine (NICODERM CQ) 21 MG/24HR 1 patch, Daily PRN  pantoprazole (PROTONIX) tablet 40 mg, QAM AC  sodium chloride flush 0.9 % injection 5-40 mL, 2 times per day  sodium chloride flush 0.9 % injection 5-40 mL, PRN  0.9 % sodium chloride infusion, PRN  potassium chloride (KLOR-CON M) extended release tablet 40 mEq, PRN   Or  potassium bicarb-citric acid (EFFER-K) effervescent tablet 40 mEq, PRN   Or  potassium chloride 10 mEq/100 mL IVPB (Peripheral Line), PRN  magnesium sulfate 2000 mg in 50 mL IVPB premix, PRN  ondansetron (ZOFRAN) injection 4 mg, Q6H PRN  senna (SENOKOT) tablet 8.6 mg, Daily PRN  acetaminophen (TYLENOL) tablet 650 mg, Q6H PRN   Or  acetaminophen (TYLENOL) suppository 650 mg, Q6H PRN  piperacillin-tazobactam (ZOSYN) 3,375 mg in sodium chloride 0.9 % 50 mL IVPB (mini-bag), Q8H  enoxaparin (LOVENOX) injection 40 mg, Daily  lactobacillus  DIET; Regular  Code:Full Code        Puma Sanchez MD   6/14/2024 12:20 PM

## 2024-06-14 NOTE — PLAN OF CARE
Problem: Discharge Planning  Goal: Discharge to home or other facility with appropriate resources  6/14/2024 1049 by Marilee Duckworth, RN  Outcome: Progressing  6/14/2024 0255 by Juliana Frank, RN  Outcome: Progressing     Problem: Pain  Goal: Verbalizes/displays adequate comfort level or baseline comfort level  6/14/2024 1049 by Marilee Duckworth, RN  Outcome: Progressing  6/14/2024 0255 by Juliana Frank, RN  Outcome: Progressing

## 2024-06-15 VITALS
HEART RATE: 66 BPM | OXYGEN SATURATION: 97 % | TEMPERATURE: 98.2 F | RESPIRATION RATE: 16 BRPM | SYSTOLIC BLOOD PRESSURE: 187 MMHG | HEIGHT: 65 IN | DIASTOLIC BLOOD PRESSURE: 74 MMHG | BODY MASS INDEX: 34.82 KG/M2 | WEIGHT: 209 LBS

## 2024-06-15 LAB
ANION GAP SERPL CALCULATED.3IONS-SCNC: 12 MMOL/L (ref 3–16)
BASOPHILS # BLD: 0 K/UL (ref 0–0.2)
BASOPHILS NFR BLD: 0.3 %
BUN SERPL-MCNC: 5 MG/DL (ref 7–20)
CALCIUM SERPL-MCNC: 8.4 MG/DL (ref 8.3–10.6)
CHLORIDE SERPL-SCNC: 110 MMOL/L (ref 99–110)
CO2 SERPL-SCNC: 22 MMOL/L (ref 21–32)
CREAT SERPL-MCNC: 0.8 MG/DL (ref 0.6–1.1)
DEPRECATED RDW RBC AUTO: 14.9 % (ref 12.4–15.4)
EOSINOPHIL # BLD: 0.5 K/UL (ref 0–0.6)
EOSINOPHIL NFR BLD: 4.8 %
GFR SERPLBLD CREATININE-BSD FMLA CKD-EPI: 88 ML/MIN/{1.73_M2}
GLUCOSE SERPL-MCNC: 120 MG/DL (ref 70–99)
HCT VFR BLD AUTO: 31 % (ref 36–48)
HGB BLD-MCNC: 10.1 G/DL (ref 12–16)
LYMPHOCYTES # BLD: 2.4 K/UL (ref 1–5.1)
LYMPHOCYTES NFR BLD: 22.3 %
MCH RBC QN AUTO: 27.4 PG (ref 26–34)
MCHC RBC AUTO-ENTMCNC: 32.5 G/DL (ref 31–36)
MCV RBC AUTO: 84.4 FL (ref 80–100)
MONOCYTES # BLD: 0.9 K/UL (ref 0–1.3)
MONOCYTES NFR BLD: 8 %
NEUTROPHILS # BLD: 7 K/UL (ref 1.7–7.7)
NEUTROPHILS NFR BLD: 64.6 %
PLATELET # BLD AUTO: 329 K/UL (ref 135–450)
PMV BLD AUTO: 7.3 FL (ref 5–10.5)
POTASSIUM SERPL-SCNC: 3.7 MMOL/L (ref 3.5–5.1)
RBC # BLD AUTO: 3.68 M/UL (ref 4–5.2)
SODIUM SERPL-SCNC: 144 MMOL/L (ref 136–145)
WBC # BLD AUTO: 10.9 K/UL (ref 4–11)

## 2024-06-15 PROCEDURE — 99232 SBSQ HOSP IP/OBS MODERATE 35: CPT | Performed by: SURGERY

## 2024-06-15 PROCEDURE — 6370000000 HC RX 637 (ALT 250 FOR IP): Performed by: PHYSICIAN ASSISTANT

## 2024-06-15 PROCEDURE — 6360000002 HC RX W HCPCS: Performed by: PHYSICIAN ASSISTANT

## 2024-06-15 PROCEDURE — 6370000000 HC RX 637 (ALT 250 FOR IP): Performed by: INTERNAL MEDICINE

## 2024-06-15 PROCEDURE — 80048 BASIC METABOLIC PNL TOTAL CA: CPT

## 2024-06-15 PROCEDURE — 85025 COMPLETE CBC W/AUTO DIFF WBC: CPT

## 2024-06-15 RX ORDER — NICOTINE 21 MG/24HR
1 PATCH, TRANSDERMAL 24 HOURS TRANSDERMAL DAILY
Status: DISCONTINUED | OUTPATIENT
Start: 2024-06-15 | End: 2024-06-15 | Stop reason: HOSPADM

## 2024-06-15 RX ADMIN — DOCUSATE SODIUM 100 MG: 100 CAPSULE, LIQUID FILLED ORAL at 09:10

## 2024-06-15 RX ADMIN — MONTELUKAST SODIUM 10 MG: 10 TABLET, FILM COATED ORAL at 09:10

## 2024-06-15 RX ADMIN — CETIRIZINE HYDROCHLORIDE 10 MG: 10 TABLET, FILM COATED ORAL at 09:11

## 2024-06-15 RX ADMIN — Medication 1 CAPSULE: at 09:10

## 2024-06-15 RX ADMIN — BUSPIRONE HYDROCHLORIDE 7.5 MG: 5 TABLET ORAL at 09:10

## 2024-06-15 RX ADMIN — ENOXAPARIN SODIUM 40 MG: 100 INJECTION SUBCUTANEOUS at 09:10

## 2024-06-15 RX ADMIN — FOLIC ACID 1 MG: 1 TABLET ORAL at 09:10

## 2024-06-15 RX ADMIN — PANTOPRAZOLE SODIUM 40 MG: 40 TABLET, DELAYED RELEASE ORAL at 05:52

## 2024-06-15 RX ADMIN — FENOFIBRATE 160 MG: 160 TABLET ORAL at 05:52

## 2024-06-15 NOTE — PLAN OF CARE
Problem: Pain  Goal: Verbalizes/displays adequate comfort level or baseline comfort level  6/15/2024 0015 by Jenelle Winn, RN  Outcome: Progressing  6/14/2024 1049 by Marilee Duckworth, RN  Outcome: Progressing

## 2024-06-15 NOTE — PROGRESS NOTES
Pt is axox4 and able to answer questions and follow commands throughout assessment. Denies pain. Tolerating diet. Is very eager for discharge. Declined Zosyn and Glycolax this morning. Call light in reach.

## 2024-06-15 NOTE — PROGRESS NOTES
[facility]        Progress Note    Patient Name: Gloria Blancas  MRN: [id number]  YOB: 1970  Date of Evaluation: 2024    Subjective:  No acute events overnight  Pain well controlled  Tolerating diet  Ambulating  Feels well, wants to go home    Post-Operative Day #9    Vital Signs:  Patient Vitals for the past 24 hrs:   BP Temp Temp src Pulse Resp SpO2   24 0900 127/68 99 °F (37.2 °C) Oral 67 18 94 %   24 0100 118/69 98.7 °F (37.1 °C) Oral 84 16 93 %   24 2130 (!) 168/91 99.4 °F (37.4 °C) Oral 95 16 95 %   24 1348 -- -- -- -- 16 --   24 1318 -- -- -- -- 16 --   24 1151 122/76 99 °F (37.2 °C) Oral 62 16 93 %        TEMPERATURE HISTORY 24H: Temp (24hrs), Av °F (37.2 °C), Min:98.7 °F (37.1 °C), Max:99.4 °F (37.4 °C)    BLOOD PRESSURE HISTORY: Systolic (36hrs), Av , Min:118 , Max:168    Diastolic (36hrs), Av, Min:68, Max:91      Intake/Output:  I/O last 3 completed shifts:  In: 680 [P.O.:680]  Out: 595 [Emesis/NG output:595]  No intake/output data recorded.  Drain/tube Output:       Physical Exam:  General: awake, alert, oriented to  person, place, time  Cardiac: regular rate and rhythm   Pulmonary: clear to auscultation bilaterally   Abdomen: soft, non-distended, appropriate incisional tenderness, incisions clean dry and intact    Labs:  CBC:    Recent Labs     24  0532 24  0506 24  0514   WBC 8.5 10.4 11.7*   HGB 10.2* 10.2* 10.4*   HCT 31.4* 31.6* 31.9*    300 333       BMP:    Recent Labs     24  0532 24  0506 24  0514    138 143   K 4.0 4.0 4.0    103 108   CO2 27 26 24   BUN 8 5* 6*   CREATININE 0.8 0.9 0.8   GLUCOSE 134* 108* 95       Hepatic:    No results for input(s): \"AST\", \"ALT\", \"BILITOT\", \"ALKPHOS\" in the last 72 hours.    Invalid input(s): \"ALB\"    Amylase:  No results found for: \"AMYLASE\"  Lipase:    Lab Results   Component Value Date/Time  postoperative collection deep to the right rectus abdominus (series 2 image 100) which measures 4.7 cm in transverse and 1.3 cm in AP. Postsurgical change involving the thoracolumbar junction upper lumbar spine. Spondylosis of the lumbar and imaged thoracic spine.  No acute osseous abnormality identified.     Small collection involving the anterior soft tissues of the abdomen which may represent a postoperative seroma or hematoma. Superimposed infection cannot be excluded. Additional small postoperative collection deep to the right rectus abdominus muscle. Small left-sided pleural effusion with adjacent atelectasis. Trace right-sided pleural fluid. Patchy infiltrate or atelectasis in right lung base. Fluid in the colon which may be related to a diarrheal illness. Additional findings noted above.     XR ABDOMEN FOR NG/OG/NE TUBE PLACEMENT    Result Date: 6/12/2024  EXAMINATION: ONE SUPINE XRAY VIEW(S) OF THE ABDOMEN 6/12/2024 1:55 pm COMPARISON: None. HISTORY: ORDERING SYSTEM PROVIDED HISTORY: Confirmation of course of NG/OG/NE tube and location of tip of tube TECHNOLOGIST PROVIDED HISTORY: Reason for exam:->Confirmation of course of NG/OG/NE tube and location of tip of tube Portable?->Yes Reason for Exam: Confirmation of course of NG/OG/NE tube and location of tip of tube FINDINGS: Enteric tube is noted with its side port and tip projecting over the area of the stomach in satisfactory position.  The visualized lungs are clear.     Enteric tube in satisfactory position.      Scheduled Meds:   metoclopramide  10 mg IntraVENous Q6H    lidocaine  1 patch TransDERmal Daily    docusate sodium  100 mg Oral Daily    polyethylene glycol  17 g Oral BID    atorvastatin  10 mg Oral Nightly    busPIRone  7.5 mg Oral TID    cetirizine  10 mg Oral Daily    fenofibrate  160 mg Oral QAM AC    folic acid  1 mg Oral Daily    montelukast  10 mg Oral Daily    pantoprazole  40 mg Oral QAM AC    sodium chloride flush  5-40 mL IntraVENous

## 2024-06-15 NOTE — DISCHARGE SUMMARY
Hospital Medicine Discharge Summary    Patient: Gloria Blancas     Gender: female  : 1970   Age: 54 y.o.  MRN: [id number]    Admitting Physician: Adrian Rios MD  Discharge Physician: Puma Sanchez MD     Code Status: Full Code     Admit Date: 6/10/2024   Discharge Date:   6/15/2024    Disposition:  Home  Time spent arranging discharge: 31 minutes    Discharge Diagnoses:    Active Hospital Problems    Diagnosis Date Noted    Postoperative abdominal pain [R10.9, G89.18] 06/10/2024     Condition at Discharge:  Stable    Hospital Course:   Post op pain,   Pain and low-grade fevers and chills started with IV antibiotics seen by surgery does not feel infectious given bowel regimen and patient's pain improved cleared for discharge by surgery with follow-up with surgery as outpatient    Discharge Exam:    BP (!) 187/74   Pulse 66   Temp 98.2 °F (36.8 °C)   Resp 16   Ht 1.651 m (5' 5\")   Wt 94.8 kg (209 lb)   LMP 2017   SpO2 97%   BMI 34.78 kg/m²   General appearance:  Appears comfortable. AAOx3  HEENT: atraumatic, Pupils equal, muscous membranes moist, no masses appreciated  Cardiovascular: Regular rate and rhythm no murmurs appreciated  Respiratory: CTAB no wheezing  Gastrointestinal: Abdomen soft, non-tender, BS+  EXT: no edema  Neurology: no gross focal deficts  Psychiatry: Appropriate affect. Not agitated  Skin: Warm, dry, no rashes appreciated    Discharge Medications:   Current Discharge Medication List        Current Discharge Medication List        Current Discharge Medication List        CONTINUE these medications which have NOT CHANGED    Details   nicotine (NICODERM CQ) 21 MG/24HR Place 1 patch onto the skin every 24 hours      albuterol sulfate HFA (PROVENTIL;VENTOLIN;PROAIR) 108 (90 Base) MCG/ACT inhaler Inhale 1-2 puffs into the lungs every 4 hours as needed for Wheezing or Shortness of Breath      docusate sodium (COLACE) 100 MG capsule Take 1 capsule by mouth daily  location of tip of tube TECHNOLOGIST PROVIDED HISTORY: Reason for exam:->Confirmation of course of NG/OG/NE tube and location of tip of tube Portable?->Yes Reason for Exam: Confirmation of course of NG/OG/NE tube and location of tip of tube FINDINGS: Enteric tube is noted with its side port and tip projecting over the area of the stomach in satisfactory position.  The visualized lungs are clear.     Enteric tube in satisfactory position.     CT ABDOMEN PELVIS W IV CONTRAST Additional Contrast? None    Result Date: 6/10/2024  EXAMINATION: CT OF THE ABDOMEN AND PELVIS WITH CONTRAST 6/10/2024 5:11 pm TECHNIQUE: CT of the abdomen and pelvis was performed with the administration of intravenous contrast. Multiplanar reformatted images are provided for review. Automated exposure control, iterative reconstruction, and/or weight based adjustment of the mA/kV was utilized to reduce the radiation dose to as low as reasonably achievable. COMPARISON: April 25, 2023 HISTORY: ORDERING SYSTEM PROVIDED HISTORY: Generalized abdominal pain postop ventral hernia repair TECHNOLOGIST PROVIDED HISTORY: If patient is on cardiac monitor and/or pulse ox, they may be taken off cardiac monitor and pulse ox, left on O2 if currently on. All monitors reattached when patient returns to room. Additional Contrast?->None Reason for exam:->Generalized abdominal pain postop ventral hernia repair Reason for Exam: Generalized abdominal pain postop ventral hernia repair FINDINGS: Removal of the variable gastric band. Lower Chest: Linear atelectasis or scarring in the lower lobes.  3 mm noncalcified nodule in the right lower lobe (series 2, image 16) which is new when compared to the prior exam.  Patchy infiltrate in the posterior aspect of the left lower lobe.  Image cardiac chambers are unremarkable in appearance.  No pericardial effusion or pericardial thickening. Organs: No calcified gallstones are seen.  Common bile duct is within normal limits.  Mild

## 2024-06-18 NOTE — ADT AUTH CERT
nodule          Abdominal pain, bloating  Seroma verses abscess  Possible UTI  OR Date 6/6/2024, robotic laparoscopic repair of 4 cm ventral hernia with mesh        PLAN:  1. Pain controlled at present, incisional and upper abdominal tenderness only, incisions healing well, nausea improved following vomiting, passing flatus but no stool since OR, low-grade fever up to 100.1 on presentation yesterday, mild leukocytosis resolved; continued supportive care, no intervention planned at this time  2. Start clear liquid diet as tolerated; monitor bowel function, add bowel regimen  3. IV hydration; monitor and correct electrolytes  4. Antibiotics  5. Activity as tolerated  6. Pulmonary toilet, incentive spirometry  7. PRN analgesics and antiemetics--minimizing narcotics as tolerated  8. DVT prophylaxis with  Lovenox and SCD's  9. Management of medical comorbid etiologies per primary team and consulting services     EDUCATION:  Educated patient on plan of care and disease process--all questions answered.  The patient is not currently smoking. Recommend maintaining a smoke-free lifestyle.     Plans discussed with patient and nursing.  Reviewed and discussed with Dr. Post.        Signed:  SHAKA Vance - CNP  6/11/2024 9:56 AM

## 2024-06-21 ENCOUNTER — OFFICE VISIT (OUTPATIENT)
Dept: SURGERY | Age: 54
End: 2024-06-21

## 2024-06-21 VITALS — SYSTOLIC BLOOD PRESSURE: 162 MMHG | WEIGHT: 210 LBS | BODY MASS INDEX: 34.95 KG/M2 | DIASTOLIC BLOOD PRESSURE: 98 MMHG

## 2024-06-21 DIAGNOSIS — K43.9 VENTRAL HERNIA WITHOUT OBSTRUCTION OR GANGRENE: Primary | ICD-10-CM

## 2024-06-21 NOTE — PROGRESS NOTES
Subjective   Patient ID: Gloria Blancas is a 54 y.o. female.    HPI    Review of Systems       Objective   Physical Exam   Abdomen soft  Incisions healing well    Assessment   54-year-old female status post robotic laparoscopic ventral hernia repair with mesh.  She is doing very well at this point in time and remains tobacco free.      Plan   Continue lifting restrictions for total of 6 weeks from surgery  I have encouraged her to remain tobacco free  Follow-up with me as needed        Foreign Armijo MD

## 2024-12-04 ENCOUNTER — HOSPITAL ENCOUNTER (OUTPATIENT)
Dept: WOMENS IMAGING | Age: 54
Discharge: HOME | End: 2024-12-04
Payer: COMMERCIAL

## 2024-12-04 VITALS — HEIGHT: 65 IN | BODY MASS INDEX: 30.16 KG/M2 | WEIGHT: 181 LBS

## 2024-12-04 DIAGNOSIS — Z12.31 VISIT FOR SCREENING MAMMOGRAM: ICD-10-CM

## 2024-12-04 PROCEDURE — 77063 BREAST TOMOSYNTHESIS BI: CPT

## 2025-02-11 ENCOUNTER — TRANSCRIBE ORDERS (OUTPATIENT)
Dept: ADMINISTRATIVE | Age: 55
End: 2025-02-11

## 2025-02-11 DIAGNOSIS — M79.605 LEFT LEG PAIN: Primary | ICD-10-CM

## (undated) DEVICE — FENESTRATED BIPOLAR FORCEPS: Brand: ENDOWRIST

## (undated) DEVICE — FORCEPS BX L240CM JAW DIA2.4MM ORNG L CAP W/ NDL DISP RAD

## (undated) DEVICE — LIGHT HANDLE: Brand: DEVON

## (undated) DEVICE — VESSEL SEALER EXTEND: Brand: ENDOWRIST

## (undated) DEVICE — TROCAR: Brand: KII FIOS FIRST ENTRY

## (undated) DEVICE — SOLUTION BDINE 16OZ

## (undated) DEVICE — 40583 XL ADVANCED TRENDELENBURG POSITIONING KIT: Brand: 40583 XL ADVANCED TRENDELENBURG POSITIONING KIT

## (undated) DEVICE — PENCIL ES L3M BTTN SWCH S STL HEX LOK BLDE ELECTRD HOLSTER

## (undated) DEVICE — GLOVE SURG SZ 75 L12IN THK75MIL DK GRN LTX FREE

## (undated) DEVICE — TOTAL TRAY, DB, 100% SILI FOLEY, 16FR 10: Brand: MEDLINE

## (undated) DEVICE — SUTURE DEV SZ 2-0 WND CLSR ABSRB GS-22 VLOC COVIDIEN VLOCM2145

## (undated) DEVICE — TOWEL,STOP FLAG GOLD N-W: Brand: MEDLINE

## (undated) DEVICE — 30978 SEE SHARP - ENHANCED INTRAOPERATIVE LAPAROSCOPE CLEANING & DEFOGGING: Brand: 30978 SEE SHARP - ENHANCED INTRAOPERATIVE LAPAROSCOPE CLEANING & DEFOGGING

## (undated) DEVICE — COVER,TABLE,77X90,STERILE: Brand: MEDLINE

## (undated) DEVICE — ANTISEPTIC 16OZ H PEROX 1ST AID ORAL DEBRIDING AGNT

## (undated) DEVICE — COVER LT HNDL BLU PLAS

## (undated) DEVICE — CANNULA SEAL

## (undated) DEVICE — TIP COVER ACCESSORY

## (undated) DEVICE — MONOPOLAR CURVED SCISSORS: Brand: ENDOWRIST

## (undated) DEVICE — SUTURE VCRL SZ 0 L54IN ABSRB VLT W/O NDL POLYGLACTIN 910 J616H

## (undated) DEVICE — TIBURON LAPAROSCOPIC CHOLECYSTECTOMY DRAPE: Brand: CONVERTORS

## (undated) DEVICE — INSUFFLATION NEEDLE TO ESTABLISH PNEUMOPERITONEUM.: Brand: INSUFFLATION NEEDLE

## (undated) DEVICE — Z DISCONTINUED NEEDLE HYPO 22GA L1.5IN BLK POLYPR HUB S STL REG BVL STR - SEE COMMENT

## (undated) DEVICE — ARM DRAPE

## (undated) DEVICE — BINDER ABD 2XL H12XL60 75IN UNISX STD E 4 PNL DISPOSABLE

## (undated) DEVICE — MARKER,SKIN,WI/RULER AND LABELS: Brand: MEDLINE

## (undated) DEVICE — SYRINGE MED 10ML TRNSLUC BRL PLUNG BLK MRK POLYPR CTRL

## (undated) DEVICE — MASK CAPNOGRAPHY AD W35IN DIA58IN SAMP LN L10FT O2 LN

## (undated) DEVICE — CADIERE FORCEPS: Brand: ENDOWRIST

## (undated) DEVICE — PUMP SUC IRR TBNG L10FT W/ HNDPC ASSEMB STRYKEFLOW 2

## (undated) DEVICE — SUTURE 0 STRATAFIX SYMMETRIC PDS + 23CM CT-2 SXPP1A446

## (undated) DEVICE — LAPAROSCOPIC SCISSORS: Brand: EPIX LAPAROSCOPIC SCISSORS

## (undated) DEVICE — BLADELESS OBTURATOR, LONG: Brand: WECK VISTA

## (undated) DEVICE — GLOVE ORANGE PI 7   MSG9070

## (undated) DEVICE — SOLUTION IV 1000ML 0.9% SOD CHL FOR IRRIG PLAS CONT

## (undated) DEVICE — LIQUIBAND RAPID ADHESIVE 36/CS 0.8ML: Brand: MEDLINE

## (undated) DEVICE — GARMENT,MEDLINE,DVT,INT,CALF,LG, GEN2: Brand: MEDLINE

## (undated) DEVICE — APPLICATOR MEDICATED 26 CC SOLUTION HI LT ORNG CHLORAPREP

## (undated) DEVICE — NEEDLE,22GX1.5",REG,BEVEL: Brand: MEDLINE

## (undated) DEVICE — SUTURE VCRL + SZ 3-0 L18IN ABSRB UD SH 1/2 CIR TAPERCUT NDL VCP864D

## (undated) DEVICE — ELECTRODE PT RET AD L9FT HI MOIST COND ADH HYDRGEL CORDED

## (undated) DEVICE — BLADELESS OBTURATOR: Brand: WECK VISTA

## (undated) DEVICE — PERMANENT CAUTERY HOOK: Brand: ENDOWRIST

## (undated) DEVICE — SUTURE VCRL SZ 4-0 L18IN ABSRB UD L19MM PS-2 3/8 CIR PRIM J496H

## (undated) DEVICE — SOLUTION ANTIFOG VIS SYS CLEARIFY LAPSCP

## (undated) DEVICE — SEAL

## (undated) DEVICE — CANNULA SAMP CO2 AD GRN 7FT CO2 AND 7FT O2 TBNG UNIV CONN

## (undated) DEVICE — GENERAL ROBOT: Brand: MEDLINE INDUSTRIES, INC.

## (undated) DEVICE — STERILE POLYISOPRENE POWDER-FREE SURGICAL GLOVES: Brand: PROTEXIS

## (undated) DEVICE — GOWN AURORA NONREINF XXL: Brand: MEDLINE INDUSTRIES, INC.

## (undated) DEVICE — BLANKET WRM W29.9XL79.1IN UP BODY FORC AIR MISTRAL-AIR

## (undated) DEVICE — TROCARS: Brand: KII® OPTICAL ACCESS SYSTEM

## (undated) DEVICE — SYRINGE MED 10ML SLIP TIP BLNT FILL AND LUERLOCK DISP

## (undated) DEVICE — ROBOTIC: Brand: MEDLINE INDUSTRIES, INC.

## (undated) DEVICE — Device

## (undated) DEVICE — FORCEPS BX L240CM JAW DIA2.8MM L CAP W/ NDL MIC MESH TOOTH

## (undated) DEVICE — SYSTEM SMK EVAC LAP TBNG FILTER HSNG BENT STYL PNK SEE CLR

## (undated) DEVICE — SUTURE ABSORBABLE L 18 IN SZ 4-0 NDL L 19 MM POLYGLACTIN 910 36/BX